# Patient Record
Sex: MALE | Race: WHITE | Employment: FULL TIME | URBAN - METROPOLITAN AREA
[De-identification: names, ages, dates, MRNs, and addresses within clinical notes are randomized per-mention and may not be internally consistent; named-entity substitution may affect disease eponyms.]

---

## 2021-07-15 ENCOUNTER — HOSPITAL ENCOUNTER (OUTPATIENT)
Age: 61
Setting detail: OBSERVATION
Discharge: HOME | End: 2021-07-16
Attending: EMERGENCY MEDICINE | Admitting: INTERNAL MEDICINE
Payer: COMMERCIAL

## 2021-07-15 ENCOUNTER — HOSPITAL ENCOUNTER (EMERGENCY)
Dept: MRI IMAGING | Age: 61
Discharge: HOME | End: 2021-07-15
Attending: STUDENT IN AN ORGANIZED HEALTH CARE EDUCATION/TRAINING PROGRAM
Payer: COMMERCIAL

## 2021-07-15 ENCOUNTER — APPOINTMENT (OUTPATIENT)
Dept: CT IMAGING | Age: 61
End: 2021-07-15
Attending: EMERGENCY MEDICINE
Payer: COMMERCIAL

## 2021-07-15 DIAGNOSIS — E78.2 MIXED HYPERLIPIDEMIA: ICD-10-CM

## 2021-07-15 DIAGNOSIS — I63.81 LEFT THALAMIC INFARCTION (HCC): ICD-10-CM

## 2021-07-15 DIAGNOSIS — I63.81 THALAMIC STROKE (HCC): Primary | ICD-10-CM

## 2021-07-15 DIAGNOSIS — I10 ESSENTIAL HYPERTENSION: ICD-10-CM

## 2021-07-15 PROBLEM — I63.9 ACUTE CVA (CEREBROVASCULAR ACCIDENT) (HCC): Status: ACTIVE | Noted: 2021-07-15

## 2021-07-15 LAB
ALBUMIN SERPL-MCNC: 3.8 G/DL (ref 3.5–5)
ALBUMIN/GLOB SERPL: 0.9 {RATIO} (ref 1.1–2.2)
ALP SERPL-CCNC: 55 U/L (ref 45–117)
ALT SERPL-CCNC: 26 U/L (ref 12–78)
ANION GAP SERPL CALC-SCNC: 4 MMOL/L (ref 5–15)
AST SERPL-CCNC: 16 U/L (ref 15–37)
BASOPHILS # BLD: 0.1 K/UL (ref 0–0.1)
BASOPHILS NFR BLD: 1 % (ref 0–1)
BILIRUB SERPL-MCNC: 0.3 MG/DL (ref 0.2–1)
BUN SERPL-MCNC: 11 MG/DL (ref 6–20)
BUN/CREAT SERPL: 14 (ref 12–20)
CALCIUM SERPL-MCNC: 8.8 MG/DL (ref 8.5–10.1)
CHLORIDE SERPL-SCNC: 106 MMOL/L (ref 97–108)
CO2 SERPL-SCNC: 28 MMOL/L (ref 21–32)
COMMENT, HOLDF: NORMAL
CREAT SERPL-MCNC: 0.77 MG/DL (ref 0.7–1.3)
DIFFERENTIAL METHOD BLD: ABNORMAL
EOSINOPHIL # BLD: 0.3 K/UL (ref 0–0.4)
EOSINOPHIL NFR BLD: 2 % (ref 0–7)
ERYTHROCYTE [DISTWIDTH] IN BLOOD BY AUTOMATED COUNT: 13.3 % (ref 11.5–14.5)
GLOBULIN SER CALC-MCNC: 4.4 G/DL (ref 2–4)
GLUCOSE SERPL-MCNC: 103 MG/DL (ref 65–100)
HCT VFR BLD AUTO: 42.2 % (ref 36.6–50.3)
HGB BLD-MCNC: 14.2 G/DL (ref 12.1–17)
IMM GRANULOCYTES # BLD AUTO: 0.1 K/UL (ref 0–0.04)
IMM GRANULOCYTES NFR BLD AUTO: 0 % (ref 0–0.5)
LYMPHOCYTES # BLD: 3.2 K/UL (ref 0.8–3.5)
LYMPHOCYTES NFR BLD: 27 % (ref 12–49)
MCH RBC QN AUTO: 28.3 PG (ref 26–34)
MCHC RBC AUTO-ENTMCNC: 33.6 G/DL (ref 30–36.5)
MCV RBC AUTO: 84.2 FL (ref 80–99)
MONOCYTES # BLD: 0.8 K/UL (ref 0–1)
MONOCYTES NFR BLD: 7 % (ref 5–13)
NEUTS SEG # BLD: 7.5 K/UL (ref 1.8–8)
NEUTS SEG NFR BLD: 63 % (ref 32–75)
NRBC # BLD: 0 K/UL (ref 0–0.01)
NRBC BLD-RTO: 0 PER 100 WBC
PLATELET # BLD AUTO: 343 K/UL (ref 150–400)
PMV BLD AUTO: 10.4 FL (ref 8.9–12.9)
POTASSIUM SERPL-SCNC: 3.8 MMOL/L (ref 3.5–5.1)
PROT SERPL-MCNC: 8.2 G/DL (ref 6.4–8.2)
RBC # BLD AUTO: 5.01 M/UL (ref 4.1–5.7)
SAMPLES BEING HELD,HOLD: NORMAL
SODIUM SERPL-SCNC: 138 MMOL/L (ref 136–145)
TROPONIN I SERPL-MCNC: <0.05 NG/ML
WBC # BLD AUTO: 11.9 K/UL (ref 4.1–11.1)

## 2021-07-15 PROCEDURE — 70551 MRI BRAIN STEM W/O DYE: CPT

## 2021-07-15 PROCEDURE — 85025 COMPLETE CBC W/AUTO DIFF WBC: CPT

## 2021-07-15 PROCEDURE — 80053 COMPREHEN METABOLIC PANEL: CPT

## 2021-07-15 PROCEDURE — 36415 COLL VENOUS BLD VENIPUNCTURE: CPT

## 2021-07-15 PROCEDURE — 70450 CT HEAD/BRAIN W/O DYE: CPT

## 2021-07-15 PROCEDURE — 84484 ASSAY OF TROPONIN QUANT: CPT

## 2021-07-15 PROCEDURE — 80061 LIPID PANEL: CPT

## 2021-07-15 PROCEDURE — 99283 EMERGENCY DEPT VISIT LOW MDM: CPT

## 2021-07-15 PROCEDURE — 83036 HEMOGLOBIN GLYCOSYLATED A1C: CPT

## 2021-07-15 RX ORDER — ASPIRIN 325 MG
325 TABLET ORAL
Status: COMPLETED | OUTPATIENT
Start: 2021-07-15 | End: 2021-07-16

## 2021-07-15 NOTE — ED TRIAGE NOTES
Patient reports he had a sudden onset of dizziness and numbness/tingling in his right leg and right shoulder yesterday at 330pm-    Pt reports his symptoms have continued today and his family requested he be evaluated. Pt feels as though his symptoms have slightly improved but are still present. Patient denies weakness in his legs/arm, grasps are equal and patient has no drifts or droops.      Pt does report he felt as though the right side of his nose was also numb today but he has no facial droop

## 2021-07-16 ENCOUNTER — APPOINTMENT (OUTPATIENT)
Dept: NON INVASIVE DIAGNOSTICS | Age: 61
End: 2021-07-16
Attending: INTERNAL MEDICINE
Payer: COMMERCIAL

## 2021-07-16 ENCOUNTER — APPOINTMENT (OUTPATIENT)
Dept: VASCULAR SURGERY | Age: 61
End: 2021-07-16
Attending: INTERNAL MEDICINE
Payer: COMMERCIAL

## 2021-07-16 VITALS
HEIGHT: 74 IN | OXYGEN SATURATION: 95 % | WEIGHT: 290.35 LBS | RESPIRATION RATE: 19 BRPM | HEART RATE: 76 BPM | TEMPERATURE: 98.2 F | SYSTOLIC BLOOD PRESSURE: 146 MMHG | DIASTOLIC BLOOD PRESSURE: 86 MMHG | BODY MASS INDEX: 37.26 KG/M2

## 2021-07-16 PROBLEM — R03.0 ELEVATED BLOOD PRESSURE READING: Status: ACTIVE | Noted: 2021-07-16

## 2021-07-16 PROBLEM — I10 HTN (HYPERTENSION): Status: ACTIVE | Noted: 2021-07-16

## 2021-07-16 LAB
CHOLEST SERPL-MCNC: 198 MG/DL
EST. AVERAGE GLUCOSE BLD GHB EST-MCNC: 120 MG/DL
HBA1C MFR BLD: 5.8 % (ref 4–5.6)
HDLC SERPL-MCNC: 45 MG/DL
HDLC SERPL: 4.4 {RATIO} (ref 0–5)
LDLC SERPL CALC-MCNC: 131.6 MG/DL (ref 0–100)
TRIGL SERPL-MCNC: 107 MG/DL (ref ?–150)
VLDLC SERPL CALC-MCNC: 21.4 MG/DL

## 2021-07-16 PROCEDURE — 97165 OT EVAL LOW COMPLEX 30 MIN: CPT

## 2021-07-16 PROCEDURE — 74011250637 HC RX REV CODE- 250/637: Performed by: INTERNAL MEDICINE

## 2021-07-16 PROCEDURE — 65270000029 HC RM PRIVATE

## 2021-07-16 PROCEDURE — 74011250636 HC RX REV CODE- 250/636: Performed by: INTERNAL MEDICINE

## 2021-07-16 PROCEDURE — 93880 EXTRACRANIAL BILAT STUDY: CPT

## 2021-07-16 PROCEDURE — 74011250637 HC RX REV CODE- 250/637: Performed by: STUDENT IN AN ORGANIZED HEALTH CARE EDUCATION/TRAINING PROGRAM

## 2021-07-16 PROCEDURE — 99204 OFFICE O/P NEW MOD 45 MIN: CPT | Performed by: PSYCHIATRY & NEUROLOGY

## 2021-07-16 PROCEDURE — 93005 ELECTROCARDIOGRAM TRACING: CPT

## 2021-07-16 PROCEDURE — 97116 GAIT TRAINING THERAPY: CPT

## 2021-07-16 PROCEDURE — 97161 PT EVAL LOW COMPLEX 20 MIN: CPT

## 2021-07-16 PROCEDURE — 96372 THER/PROPH/DIAG INJ SC/IM: CPT

## 2021-07-16 PROCEDURE — 97535 SELF CARE MNGMENT TRAINING: CPT

## 2021-07-16 PROCEDURE — 99218 HC RM OBSERVATION: CPT

## 2021-07-16 RX ORDER — ACETAMINOPHEN 325 MG/1
650 TABLET ORAL
Status: DISCONTINUED | OUTPATIENT
Start: 2021-07-16 | End: 2021-07-16 | Stop reason: HOSPADM

## 2021-07-16 RX ORDER — ATORVASTATIN CALCIUM 20 MG/1
80 TABLET, FILM COATED ORAL
Status: DISCONTINUED | OUTPATIENT
Start: 2021-07-16 | End: 2021-07-16 | Stop reason: HOSPADM

## 2021-07-16 RX ORDER — ENOXAPARIN SODIUM 100 MG/ML
40 INJECTION SUBCUTANEOUS EVERY 24 HOURS
Status: DISCONTINUED | OUTPATIENT
Start: 2021-07-16 | End: 2021-07-16 | Stop reason: HOSPADM

## 2021-07-16 RX ORDER — IBUPROFEN 200 MG
600 TABLET ORAL
COMMUNITY
End: 2021-08-10

## 2021-07-16 RX ORDER — GUAIFENESIN 100 MG/5ML
81 LIQUID (ML) ORAL DAILY
Status: DISCONTINUED | OUTPATIENT
Start: 2021-07-16 | End: 2021-07-16 | Stop reason: HOSPADM

## 2021-07-16 RX ORDER — ATORVASTATIN CALCIUM 80 MG/1
80 TABLET, FILM COATED ORAL
Qty: 30 TABLET | Refills: 1 | Status: SHIPPED | OUTPATIENT
Start: 2021-07-16 | End: 2021-08-15

## 2021-07-16 RX ORDER — ACETAMINOPHEN 650 MG/1
650 SUPPOSITORY RECTAL
Status: DISCONTINUED | OUTPATIENT
Start: 2021-07-16 | End: 2021-07-16 | Stop reason: HOSPADM

## 2021-07-16 RX ORDER — SODIUM CHLORIDE 0.9 % (FLUSH) 0.9 %
5-40 SYRINGE (ML) INJECTION AS NEEDED
Status: DISCONTINUED | OUTPATIENT
Start: 2021-07-16 | End: 2021-07-16 | Stop reason: HOSPADM

## 2021-07-16 RX ORDER — CLOPIDOGREL BISULFATE 75 MG/1
75 TABLET ORAL DAILY
Status: DISCONTINUED | OUTPATIENT
Start: 2021-07-16 | End: 2021-07-16 | Stop reason: HOSPADM

## 2021-07-16 RX ORDER — GUAIFENESIN 100 MG/5ML
81 LIQUID (ML) ORAL DAILY
Qty: 30 TABLET | Refills: 3 | Status: SHIPPED | OUTPATIENT
Start: 2021-07-17 | End: 2021-08-10

## 2021-07-16 RX ORDER — SODIUM CHLORIDE 0.9 % (FLUSH) 0.9 %
5-40 SYRINGE (ML) INJECTION EVERY 8 HOURS
Status: DISCONTINUED | OUTPATIENT
Start: 2021-07-16 | End: 2021-07-16 | Stop reason: HOSPADM

## 2021-07-16 RX ORDER — ASPIRIN 325 MG
325 TABLET ORAL DAILY
Status: DISCONTINUED | OUTPATIENT
Start: 2021-07-16 | End: 2021-07-16

## 2021-07-16 RX ORDER — POLYETHYLENE GLYCOL 3350 17 G/17G
17 POWDER, FOR SOLUTION ORAL DAILY PRN
Status: DISCONTINUED | OUTPATIENT
Start: 2021-07-16 | End: 2021-07-16 | Stop reason: HOSPADM

## 2021-07-16 RX ADMIN — CLOPIDOGREL BISULFATE 75 MG: 75 TABLET ORAL at 12:38

## 2021-07-16 RX ADMIN — Medication 10 ML: at 06:23

## 2021-07-16 RX ADMIN — ATORVASTATIN CALCIUM 80 MG: 20 TABLET, FILM COATED ORAL at 01:57

## 2021-07-16 RX ADMIN — Medication 10 ML: at 01:11

## 2021-07-16 RX ADMIN — ENOXAPARIN SODIUM 40 MG: 40 INJECTION SUBCUTANEOUS at 06:23

## 2021-07-16 RX ADMIN — ASPIRIN 81 MG: 81 TABLET, CHEWABLE ORAL at 12:38

## 2021-07-16 RX ADMIN — ASPIRIN 325 MG ORAL TABLET 325 MG: 325 PILL ORAL at 01:10

## 2021-07-16 NOTE — ED NOTES
Nursing rounds. Pt oob sitting on stool eating lunch. Daughter at bedside. Denies c/o. Will con't to monitor.

## 2021-07-16 NOTE — PROGRESS NOTES
OCCUPATIONAL THERAPY EVALUATION/DISCHARGE  Patient: Julia Lagos (82 y.o. male)  Date: 7/16/2021  Primary Diagnosis: Acute CVA (cerebrovascular accident) [facility]) [I63.9]       Precautions:    ([facility] stroke protocol)    ASSESSMENT  Based on the objective data described below, the patient presents with hospital admission secondary to acute CVA. Patient reports symptoms of numbness/tingling in R shoulder and RLE. Patient with confirmed small left thalamic infarct. Patient received supine in bed, agreeable to activity and reports only continued tingling in RLE. Patient able to demonstrate good strength, functional mobility, coordination and balance. [facility] and verbalized understanding. Functional Outcome Measure: The patient scored 66/66 on the Barthel Index  outcome measure . Other factors to consider for discharge: none     PLAN :  Recommend with staff:     Recommendation for discharge: (in order for the patient to meet his/her long term goals)  No skilled occupational therapy/ follow up rehabilitation needs identified at this time. This discharge recommendation:  Has been made in collaboration with the attending provider and/or case management    IF patient discharges home will need the following DME: none       SUBJECTIVE:   Patient stated I have been working for 2 days. I didn't know what was going on. I thought it was the heat.     OBJECTIVE DATA SUMMARY:   HISTORY:   No past medical history on file. No past surgical history on file.     Prior Level of Function/Environment/Context: independent  Expanded or extensive additional review of patient history:   Home Situation  Home Environment: Private residence  Living Alone: No  Support Systems: Spouse/Significant Other/Partner  Patient Expects to be Discharged to[de-identified] House  Current DME Used/Available at Home: None    Hand dominance: Right    EXAMINATION OF PERFORMANCE DEFICITS:  Cognitive/Behavioral Status:  Neurologic State: Alert  Orientation Level: Oriented X4  Cognition: Appropriate decision making; Follows commands  Perception: Appears intact  Perseveration: No perseveration noted  Safety/Judgement: Awareness of environment    Skin: intact as seen    Edema: none noted     Hearing:       Vision/Perceptual:    Tracking: Able to track stimulus in all quadrants w/o difficulty                                Range of Motion:  AROM: Within functional limits  PROM: Within functional limits                      Strength:  Strength: Within functional limits                Coordination:  Coordination: Within functional limits  Fine Motor Skills-Upper: Left Intact; Right Intact    Gross Motor Skills-Upper: Left Intact; Right Intact    Tone & Sensation:  Tone: Normal  Sensation: Impaired (RLE)                      Balance:  Sitting: Intact  Standing: Intact    Functional Mobility and Transfers for ADLs:  Bed Mobility:  Rolling: Independent  Supine to Sit: Independent  Sit to Supine: Independent  Scooting: Independent    Transfers:  Sit to Stand: Independent  Stand to Sit: Independent  Toilet Transfer : Independent    ADL Assessment:  Feeding: Independent    Oral Facial Hygiene/Grooming: Independent    Bathing: Independent    Upper Body Dressing: Independent    Lower Body Dressing: Independent    Toileting: Independent                ADL Intervention and task modifications:                                     Cognitive Retraining  Safety/Judgement: Awareness of environment    Therapeutic Exercise:   Functional Measure:  Fugl-Canela Assessment of Motor Recovery after Stroke:     Reflex Activity  Flexors/Biceps/Fingers: Can be elicited  Extensors/Triceps: Can be elicited  Reflex Subtotal: 4    Volitional Movement Within Synergies  Shoulder Retraction: Full  Shoulder Elevation: Full  Shoulder Abduction (90 degrees): Full  Shoulder External Rotation: Full  Elbow Flexion: Full  Forearm Supination: Full  Shoulder Adduction/Internal Rotation: Full  Elbow Extension: Full  Forearm Pronation: Full  Subtotal: 18    Volitional Movement Mixing Synergies  Hand to Lumbar Spine: Full  Shoulder Flexion (0-90 degrees): Full  Pronation-Supination: Full  Subtotal: 6    Volitional Movement With Little or No Synergy  Shoulder Abduction (0-90 degrees): Full  Shoulder Flexion ( degrees): Full  Pronation/Supination: Full  Subtotal : 6    Normal Reflex Activity  Biceps, Triceps, Finger Flexors: Full  Subtotal : 2    Upper Extremity Total   Upper Extremity Total: 36    Wrist  Stability at 15 Degree Dorsiflexion: Full  Repeated Dorsiflexion/ Volar Flexion: Full  Stability at 15 Degree Dorsiflexion: Full  Repeated Dorsiflexion/ Volar Flexion: Full  Circumduction: Full  Wrist Total: 10    Hand  Mass Flexion: Full  Mass Extension: Full  Grasp A: Full  Grasp B: Full  Grasp C: Full  Grasp D: Full  Grasp E: Full  Hand Total: 14    Coordination/Speed  Tremor: None  Dysmetria: None  Time: <1s  Coordination/Speed Total : 6    Total A-D  Total A-D (Motor Function): 66/66         This is a reliable/valid measure of arm function after a neurological event. It has established value to characterize functional status and for measuring spontaneous and therapy-induced recovery; tests proximal and distal motor functions. Fugl-Canela Assessment  UE scores recorded between five and 30 days post neurologic event can be used to predict UE recovery at six months post neurologic event. Severe = 0-21 points   Moderately Severe = 22-33 points   Moderate = 34-47 points   Mild = 48-66 points  MICHELLE Prieto, ANNETTE Alvarez, & ILIA Bond (1992). Measurement of motor recovery after stroke: Outcome assessment and sample size requirements.  Stroke, 23, pp. 5036-8124.   --------------------------------------------------------------------------------------------------------------------------------------------------------------------  MCID:  Stroke:   Aguila Alvares, 2001; n = 171; mean age 79 (11) years; assessed within 16 (12) days of stroke, Acute Stroke)  FMA Motor Scores from Admission to Discharge   10 point increase in FMA Upper Extremity = 1.5 change in discharge FIM   10 point increase in FMA Lower Extremity = 1.9 change in discharge FIM  MDC:   Stroke:   Antione Peters et al, 2008, n = 14, mean age = 59.9 (14.6) years, assessed on average 14 (6.5) months post stroke, Chronic Stroke)   FMA = 5.2 points for the Upper Extremity portion of the assessment         Occupational Therapy Evaluation Charge Determination   History Examination Decision-Making   LOW Complexity : Brief history review  LOW Complexity : 1-3 performance deficits relating to physical, cognitive , or psychosocial skils that result in activity limitations and / or participation restrictions  LOW Complexity : No comorbidities that affect functional and no verbal or physical assistance needed to complete eval tasks       Based on the above components, the patient evaluation is determined to be of the following complexity level: LOW   Pain Rating:      Activity Tolerance:   Good    After treatment patient left in no apparent distress:    Supine in bed and Call bell within reach    COMMUNICATION/EDUCATION:   The patients plan of care was discussed with: Physical therapist and Registered nurse.      Thank you for this referral.  Earlene Whitney OTR/L  Time Calculation: 23 mins

## 2021-07-16 NOTE — CONSULTS
STROKE/TRANSIENT ISCHEMIC ATTACK CONSULT NOTE    Patient ID:  Mukesh Ritchie  [id number]  28 y.o.  1960    Date of Consultation:  July 16, 2021    Referring Physician: Dr. Andre Cahves    Reason for Consultation:  Right sided numbness    History of Present Illness:     Patient Active Problem List    Diagnosis Date Noted    Elevated blood pressure reading 07/16/2021    Acute CVA (cerebrovascular accident) ([address] 07/15/2021     No past medical history on file. No past surgical history on file. Prior to Admission medications    Medication Sig Start Date End Date Taking? Authorizing Provider   ibuprofen (MOTRIN) 200 mg tablet Take 600 mg by mouth daily as needed for Pain. Yes Provider, Historical     No Known Allergies   Social History     Tobacco Use    Smoking status: Not on file   Substance Use Topics    Alcohol use: Not on file      No family history on file. Subjective:      Mukesh Ritchie is a 59 y.o. obese RHWM with no significant past medical history who was admitted from the ER for new onset right sided numbness. Patient admits to not seeing her regular doctor for the past 3 years. Condition started 1 day prior to admission with numbness of the right leg and right shoulder. There is improved and then was mainly localized to the right anterolateral chest area and flank. Persistence led patient to go to the ER. Also noted some dizziness. In the ER blood pressure was 172/99. Laboratory work-up revealed increased WBC at 11.9, slightly increased hemoglobin A1c at 5.8, increased LDL at 131.6. CMP and troponin were unremarkable. Head CT without contrast did not reveal any acute process. Brain MRI revealed small left acute thalamic infarct. Carotid Doppler study revealed 0 to 49% bilateral ICA stenosis. Echocardiogram is pending. When seen, patient reports resolution of his symptoms. Denies any weakness. No speech changes.     Outside reports reviewed: ER records, radiology reports, lab reports. Review of Systems:    Pertinent items are noted in HPI. Objective:     Patient Vitals for the past 8 hrs:   BP Temp Pulse Resp SpO2   07/16/21 0400 (!) 160/82 97.8 °F (36.6 °C) 68 12 95 %     PHYSICAL EXAM:    NEUROLOGICAL EXAM:    Appearance: The patient is well developed, well nourished, provides a coherent history and is in no acute distress. Mental Status: Oriented to time, place and person. Fluent, no aphasia or dysarthria. Mood and affect appropriate. Cranial Nerves:   Intact visual fields. RHETT, EOM's full, no nystagmus, no ptosis. Facial sensation is normal. Corneal reflexes are intact. Facial movement is symmetric. Hearing is normal bilaterally. Palate is midline with normal elevation. Sternocleidomastoid and trapezius muscles are normal. Tongue is midline. Motor:  5/5 strength in upper and lower proximal and distal muscles. Normal bulk and tone. No pronator drift. Reflexes:   Deep tendon reflexes 1+/4 and symmetrical. Downgoing toes. Sensory:   Normal to cold, pinprick and vibration except decrease PP right sheen and foot. Gait:  Normal gait. No Romberg. Tremor:   No tremor noted. Cerebellar:  Intact FTN/ALEXEY/HTS. Imaging  CT Head, brain MRI: reviewed    Lab Review    Recent Results (from the past 24 hour(s))   SAMPLES BEING HELD    Collection Time: 07/15/21  7:58 PM   Result Value Ref Range    SAMPLES BEING HELD  1RED TOP, 1 DRK GREEN     COMMENT        Add-on orders for these samples will be processed based on acceptable specimen integrity and analyte stability, which may vary by analyte.    CBC WITH AUTOMATED DIFF    Collection Time: 07/15/21  7:58 PM   Result Value Ref Range    WBC 11.9 (H) 4.1 - 11.1 K/uL    RBC 5.01 4.10 - 5.70 M/uL    HGB 14.2 12.1 - 17.0 g/dL    HCT 42.2 36.6 - 50.3 %    MCV 84.2 80.0 - 99.0 FL    MCH 28.3 26.0 - 34.0 PG    MCHC 33.6 30.0 - 36.5 g/dL    RDW 13.3 11.5 - 14.5 %    PLATELET 189 331 - 272 K/uL    MPV 10.4 8.9 - 12.9 FL    NRBC 0.0 0  WBC    ABSOLUTE NRBC 0.00 0.00 - 0.01 K/uL    NEUTROPHILS 63 32 - 75 %    LYMPHOCYTES 27 12 - 49 %    MONOCYTES 7 5 - 13 %    EOSINOPHILS 2 0 - 7 %    BASOPHILS 1 0 - 1 %    IMMATURE GRANULOCYTES 0 0.0 - 0.5 %    ABS. NEUTROPHILS 7.5 1.8 - 8.0 K/UL    ABS. LYMPHOCYTES 3.2 0.8 - 3.5 K/UL    ABS. MONOCYTES 0.8 0.0 - 1.0 K/UL    ABS. EOSINOPHILS 0.3 0.0 - 0.4 K/UL    ABS. BASOPHILS 0.1 0.0 - 0.1 K/UL    ABS. IMM. GRANS. 0.1 (H) 0.00 - 0.04 K/UL    DF AUTOMATED     METABOLIC PANEL, COMPREHENSIVE    Collection Time: 07/15/21  7:58 PM   Result Value Ref Range    Sodium 138 136 - 145 mmol/L    Potassium 3.8 3.5 - 5.1 mmol/L    Chloride 106 97 - 108 mmol/L    CO2 28 21 - 32 mmol/L    Anion gap 4 (L) 5 - 15 mmol/L    Glucose 103 (H) 65 - 100 mg/dL    BUN 11 6 - 20 MG/DL    Creatinine 0.77 0.70 - 1.30 MG/DL    BUN/Creatinine ratio 14 12 - 20      GFR est AA >60 >60 ml/min/1.73m2    GFR est non-AA >60 >60 ml/min/1.73m2    Calcium 8.8 8.5 - 10.1 MG/DL    Bilirubin, total 0.3 0.2 - 1.0 MG/DL    ALT (SGPT) 26 12 - 78 U/L    AST (SGOT) 16 15 - 37 U/L    Alk.  phosphatase 55 45 - 117 U/L    Protein, total 8.2 6.4 - 8.2 g/dL    Albumin 3.8 3.5 - 5.0 g/dL    Globulin 4.4 (H) 2.0 - 4.0 g/dL    A-G Ratio 0.9 (L) 1.1 - 2.2     TROPONIN I    Collection Time: 07/15/21  7:58 PM   Result Value Ref Range    Troponin-I, Qt. <0.05 <0.05 ng/mL   LIPID PANEL    Collection Time: 07/15/21  7:58 PM   Result Value Ref Range    Cholesterol, total 198 <200 MG/DL    Triglyceride 107 <150 MG/DL    HDL Cholesterol 45 MG/DL    LDL, calculated 131.6 (H) 0 - 100 MG/DL    VLDL, calculated 21.4 MG/DL    CHOL/HDL Ratio 4.4 0.0 - 5.0     HEMOGLOBIN A1C WITH EAG    Collection Time: 07/15/21  7:58 PM   Result Value Ref Range    Hemoglobin A1c 5.8 (H) 4.0 - 5.6 %    Est. average glucose 120 mg/dL         Assessment:   Acute thalamic infarct  Hyperlipidemia  Hypertension    Plan:   Neurological examination reveals mainly decreased sensation right anterior shin and foot. Status post left thalamic infarct. Likely secondary to hypertension. Head CT without contrast did not reveal any acute process. Brain MRI without contrast revealed small left thalamic infarct. Carotid Doppler study did not reveal any significant stenosis. LDL was elevated at 131.6. Continue atorvastatin 80 mg daily. Advised dietary modification. Echocardiogram is pending. Discontinue Plavix. Continue aspirin 81 mg daily for stroke prophylaxis. Patient was advised to monitor blood pressures at home twice a day and if any readings greater than 130/80 then he needs to be on blood pressure medication. Advised to establish care with a primary physician. Patient is okay to be discharged from a neurological standpoint. Patient was informed per DMV regulation he cannot drive for 6 months. He understood. Follow-up with outpatient neurology in 4 weeks. Thank you for the consult. This note was created using voice recognition software. Despite editing, there may be syntax errors.

## 2021-07-16 NOTE — DISCHARGE INSTRUCTIONS
DMV Transient Ischemic Attack and/or Cerebral Vascular Accident Policy   It is the policy of the Department of Motor Vehicles, based on guidance and recommendations from the [address], that if a  suffers a Transient Ischemic Attack (TIA), the s privilege to operate a motor vehicle will be suspended for three months. The three-month suspension may be shortened for drivers who have suffered a TIA, provided that treatment has been provided mitigating the risk of reoccurrence and there is no impact on a s ability to operate a motor vehicle safely. These cases may be referred to the [address] for their guidance and recommendations. If a  suffers a Cerebral Vascular Accident (CVA), the s privilege to operate a motor vehicle will be suspended for six months. This six-month suspension period may be shortened if V receives information from the 's health care provider indicating that the  has fully recovered. These cases may be referred to the [address] for its guidance and recommendations. Following the six-month suspension, [facility] will request an evaluation with a certified  rehabilitation specialist if the  has suffered paralysis or cognitive changes due to the CVA. If the s physical and cognitive information is not indicated in the medical report received by the agency, [facility] will request it from the health care provider.    ([email or web address]). Based on the information received about the s cognitive abilities, the  may also be subject to the [facility]. The  is also required to furnish an updated Vision Report with an examination date that is not older than 90 days and occurs after the TIA/CVA, in accordance with [place] Code [place].2-311.      [facility] may impose additional requirements on the individual depending on the information received by the agency. [email or web address]

## 2021-07-16 NOTE — PROGRESS NOTES
Admission Medication Reconciliation:     Information obtained from:    Patient via interview in [address]  RxQuery data available¹:  YES    Comments/Recommendations:   Patient able to confirm name, , allergies, and preferred pharmacy  Updated PTA medication list       ¹RxQuery pharmacy benefit data reflects medications filled and processed through the patient's insurance, however   this data does NOT capture whether the medication was picked up or is currently being taken by the patient. Prior to Admission Medications   Prescriptions Last Dose Informant Taking?   ibuprofen (MOTRIN) 200 mg tablet 7/15/2021 at pm  Yes   Sig: Take 600 mg by mouth daily as needed for Pain. Facility-Administered Medications: None         Please contact the main inpatient pharmacy with any questions or concerns at [phone] and we will direct you to the clinical pharmacist covering this patient's care while in-house.    Galo Akhtar, DillonD, BCPS

## 2021-07-16 NOTE — PROGRESS NOTES
Patient alert and chewing gum. No speech or swallowing issues noted by patient. SLP evaluation deferred at this time.

## 2021-07-16 NOTE — PROGRESS NOTES
Problem: Mobility Impaired (Adult and Pediatric)  Goal: *Acute Goals and Plan of Care (Insert Text)  Outcome: Progressing Towards Goal   PHYSICAL THERAPY EVALUATION/DISCHARGE  Patient: Mukesh Ritchie (53 y.o. male)  Date: 7/16/2021  Primary Diagnosis: Acute CVA (cerebrovascular accident) ([facility] [I63.9]        Precautions:    ([facility] stroke protocol)      ASSESSMENT  Based on the objective data described below, the patient presents with admission due to small L thalamic infarct with symptoms of tingling/numbness R shoulder/UE/LE and dizziness. Received in ER bed with good resolution, full motor control, good balance, and gait. Pt scoring 56/56 on Hobbs Balance Test making him a LOW to no fall risk. Gait of 100' without need of assistive device. Toileted self with independence. Pt educated with  [facility] stroke protcol. Further skilled acute physical therapy is not indicated at this time. Functional Outcome Measure: The patient scored 56/56 on the Hobbs outcome measure which is indicative of LOW fall risk. Other factors to consider for discharge: none     Further skilled acute physical therapy is not indicated at this time. PLAN :  Recommendation for discharge: (in order for the patient to meet his/her long term goals)  No skilled physical therapy/ follow up rehabilitation needs identified at this time. This discharge recommendation:  Has not yet been discussed the attending provider and/or case management    IF patient discharges home will need the following DME: none       SUBJECTIVE:   Patient stated I am doing better now.     OBJECTIVE DATA SUMMARY:   HISTORY:    No past medical history on file. No past surgical history on file.     Prior level of function: independent  Personal factors and/or comorbidities impacting plan of care: per above and below    Home Situation  Home Environment: Private residence  Living Alone: No  Support Systems: Spouse/Significant Other/Partner  Patient Expects to be Discharged to[de-identified] House  Current DME Used/Available at Home: None    EXAMINATION/PRESENTATION/DECISION MAKING:   Critical Behavior:  Neurologic State: Alert  Orientation Level: Oriented X4  Cognition: Appropriate decision making, Follows commands  Safety/Judgement: Awareness of environment  Hearing:     Skin:  IV  Edema:  ANL  Range Of Motion:  AROM: Within functional limits           PROM: Within functional limits           Strength:    Strength:  Within functional limits                    Tone & Sensation:   Tone: Normal              Sensation: Impaired (RLE)               Coordination:  Coordination: Within functional limits  Vision:   Tracking: Able to track stimulus in all quadrants w/o difficulty  Functional Mobility:  Bed Mobility:  Rolling: Independent  Supine to Sit: Independent  Sit to Supine: Independent  Scooting: Independent  Transfers:  Sit to Stand: Independent  Stand to Sit: Independent                       Balance:   Sitting: Intact  Standing: Intact  Ambulation/Gait Training:  Distance (ft): 100 Feet (ft)  Assistive Device: Gait belt  Ambulation - Level of Assistance: Independent                                               Functional Measure:  Hobbs Balance Test:    Sitting to Standin  Standing Unsupported: 4  Sitting with Back Unsupported: 4  Standing to Sittin  Transfers: 4  Standing Unsupported with Eyes Closed: 4  Standing Unsupported with Feet Together: 4  Reach Forward with Outstretched Arm: 4   Object: 4  Turn to Look Over Shoulders: 4  Turn 360 Degrees: 4  Alternate Foot on Step/Stool: 4  Standing Unsupported One Foot in Front: 4  Stand on One Le  Total: 56/56         56=Maximum possible score;   0-20=High fall risk  21-40=Moderate fall risk   41-56=Low fall risk               Physical Therapy Evaluation Charge Determination   History Examination Presentation Decision-Making   MEDIUM  Complexity : 1-2 comorbidities / personal factors will impact the outcome/ POC  LOW Complexity : 1-2 Standardized tests and measures addressing body structure, function, activity limitation and / or participation in recreation  LOW Complexity : Stable, uncomplicated  Other outcome measures Hobbs  LOW       Based on the above components, the patient evaluation is determined to be of the following complexity level: LOW     Activity Tolerance:   Good      After treatment patient left in no apparent distress:   Supine in bed and Call bell within reach    COMMUNICATION/EDUCATION:   The patients plan of care was discussed with: Occupational therapist and Registered nurse. Fall prevention education was provided and the patient/caregiver indicated understanding., Patient/family have participated as able in goal setting and plan of care. , and Patient/family agree to work toward stated goals and plan of care.     Thank you for this referral.  Jeanna Cordial, PT   Time Calculation: 24 mins

## 2021-07-16 NOTE — ED NOTES
Report rec'd from Singh, [facility], assumed care. Pt presents lying on stretcher, R sided, resting quietly. Eyes closed, resp e/u, nad, no voiced c/o. Will con't to monitor.

## 2021-07-16 NOTE — ED PROVIDER NOTES
The history is provided by the patient. Numbness  This is a new problem. The current episode started yesterday. The problem has been gradually improving. There was right upper extremity and right lower extremity focality noted. Primary symptoms include loss of sensation. Pertinent negatives include no focal weakness, no loss of balance, no slurred speech, no speech difficulty, no visual change, no mental status change, no unresponsiveness and no disorientation. There has been no fever. Pertinent negatives include no shortness of breath, no chest pain, no vomiting, no altered mental status, no confusion, no nausea and no bowel incontinence. There were no medications administered prior to arrival. Associated medical issues do not include trauma or dementia. No past medical history on file. No past surgical history on file. No family history on file. Social History     Socioeconomic History    Marital status:      Spouse name: Not on file    Number of children: Not on file    Years of education: Not on file    Highest education level: Not on file   Occupational History    Not on file   Tobacco Use    Smoking status: Not on file   Substance and Sexual Activity    Alcohol use: Not on file    Drug use: Not on file    Sexual activity: Not on file   Other Topics Concern    Not on file   Social History Narrative    Not on file     Social Determinants of Health     Financial Resource Strain:     Difficulty of Paying Living Expenses:    Food Insecurity:     Worried About Running Out of Food in the Last Year:     [address] in the Last Year:    Transportation Needs:     Lack of Transportation (Medical):      Lack of Transportation (Non-Medical):    Physical Activity:     Days of Exercise per Week:     Minutes of Exercise per Session:    Stress:     Feeling of Stress :    Social Connections:     Frequency of Communication with Friends and Family:     Frequency of Social Gatherings with Friends and Family:     Attends Mu-ism Services:     Active Member of Clubs or Organizations:     Attends Club or Organization Meetings:     Marital Status:    Intimate Partner Violence:     Fear of Current or Ex-Partner:     Emotionally Abused:     Physically Abused:     Sexually Abused: ALLERGIES: Patient has no known allergies. Review of Systems   Constitutional: Negative for chills and fever. HENT: Negative for sore throat. Respiratory: Negative for cough and shortness of breath. Cardiovascular: Negative for chest pain. Gastrointestinal: Negative for bowel incontinence, nausea and vomiting. Genitourinary: Negative for decreased urine volume and difficulty urinating. Musculoskeletal: Negative for back pain and neck pain. Neurological: Positive for numbness. Negative for focal weakness, speech difficulty and loss of balance. Psychiatric/Behavioral: Negative for confusion. Vitals:    07/15/21 1943   BP: (!) 172/99   Pulse: 71   Resp: 16   Temp: 97.3 °F (36.3 °C)   SpO2: 96%   Weight: 131.7 kg (290 lb 5.5 oz)   Height: 6' 2\" (1.88 m)            Physical Exam  Constitutional:       General: He is not in acute distress. Appearance: Normal appearance. He is obese. HENT:      Head: Normocephalic and atraumatic. Eyes:      Extraocular Movements: Extraocular movements intact. Conjunctiva/sclera: Conjunctivae normal.      Pupils: Pupils are equal, round, and reactive to light. Cardiovascular:      Rate and Rhythm: Normal rate and regular rhythm. Pulses: Normal pulses. Heart sounds: Normal heart sounds. Pulmonary:      Effort: Pulmonary effort is normal.      Breath sounds: Normal breath sounds. Abdominal:      General: Abdomen is flat. Bowel sounds are normal.      Palpations: Abdomen is soft. Tenderness: There is no abdominal tenderness. There is no guarding. Musculoskeletal:         General: Normal range of motion. Cervical back: Normal range of motion and neck supple. Right lower leg: No edema. Left lower leg: No edema. Skin:     General: Skin is warm and dry. Neurological:      General: No focal deficit present. Mental Status: He is alert and oriented to person, place, and time. Cranial Nerves: No cranial nerve deficit. Sensory: No sensory deficit. Motor: No weakness. Coordination: Coordination normal.      Gait: Gait normal.      Deep Tendon Reflexes: Reflexes normal.          [facility]  ED Course as of Jul 15 2344   Thu Jul 15, 2021   6136 TROPONIN I [DL]   2330 11:30 PM  Change of shift. Care of patient taken over from Dr. Linda Martinez; H&P reviewed, bedside handoff complete. Awaiting MRI. If neg dc with with follow-up with neuro      [ZD]      ED Course User Index  [DL] Marvin Soriano MD  [ZD] Amalia Sam MD     Patient presents with about 24 hours of numbness and tingling in his R foot and R shoulder. No recent illnesses, significant medical history, or medications. BP was 172/99 in triage, repeat was 167/104. Neurological exam was normal, back exam was not contributory. CBC demonstrated no anemia. Considering CVA vs polyneuropathy vs herniated disc. With elevated BP, noncontrast head CT was deemed appropriate and showed no acute abnormality. MRI showed \"Tiny, acute, left thalamic infarction\". He will be admitted to the hospitalist service.     Procedures

## 2021-07-16 NOTE — H&P
[facility]  [address], [facility] [address]  [phone]    Hospitalist Admission Note      NAME:  Gigi Roberts   :   1960   MRN:  [id number]     PCP:  Brent Moralez MD     Date of Service/Time:  2021 2:48 AM         Assessment / Plan:       59 y.o. male with no significant PMH presenting w/ R-sided numbness, found to have tiny L-thalamic CVA      Acute CVA (cerebrovascular accident): MRI brain showed tiny acute left thalamic infarction. Check EKG and monitor on tele. Complete CVA workup w/ TTE w/ bubble, carotid dopplers. Early initiation and short-term (21 days) DAPT using ASA + Plavix rather than aspirin monotherapy for patients with acute high-risk TIA or minor ischemic stroke who do not have a known cardiac source that requires anticoagulation. High intensity statin with atorvastatin, check lipid panel. Send A1c to further risk stratify. Frequent neuro checks. Permissive HTN for 24 hours post CVA, and aim for better BP control 24 to 48 hours after stroke onset. Treat HTN only if extreme (SBP>220 or DBP>120 or if pt has another clear indication, e.g. AS, heart failure, aortic dissection, hypertensive encephalopathy). If treating, would slowly decrease BP by ~15% during the first 24h after stroke onset. Can treat with IV labetalol or nicardipine. PT / OT / Speech evaluation if needed. Neurology consult. Patient will need to be advised of DMV driving restriction. Elevated blood pressure reading: permissive HTN as above      Obesity: Body mass index is 37.28 kg/m². Would benefit from weight loss and dietary / lifestyle modifications      Code Status: FULL     Surrogate decision maker: wife      ED notes, lab results, and imaging studies reviewed.        Total time spent with patient: 50 Minutes   Time spent in the care of this patient included reviewing records, discussing with nursing, obtaining history and examining the patient, and discussing treatment plans, with >50% time spent counseling/coordinating care    Risk of deterioration: High                 Care Plan discussed with: ED provider, Patient, Nursing Staff and >50% of time spent in counseling and coordination of care    Discussed:  Care Plan and D/C Planning    Prophylaxis:  Lovenox    Disposition:  Home w/Family                 Subjective:     CHIEF COMPLAINT: numbness     HISTORY OF PRESENT ILLNESS:     Mr. Cleopatra Romberg is a 59 y.o. male w/ no PMH presenting w/ numbness. Noticed sudden onset of dizziness and numbness/tingling in his right leg and right shoulder on Wednesday ~ 3:30 pm. Symptoms had continued so came to ED for evaluation at his wife's request. Symptoms have improved today and now only feel numbness in his right thorax region. Denied weakness, dizziness, confusion, headache, slurred speech, facial droop, CP, SOB. ED workup included brain MRI showing tiny L-thalamic infarct. Mr. Cleopatra Romberg is admitted for further evaluation and management.       PMH: unknown, has not seen a doctor in 3 years      Social History     Tobacco Use    Smoking status: No   Substance Use Topics    Alcohol use: Occasional         Family History: +CAD (father w/ CAD s/p CABG)    No Known Allergies     Prior to Admission medications    Not on File       Review of Systems:  (bold if positive, if negative)    Gen:  Eyes:  ENT:  CVS:  Pulm:  GI:  :  MS:  Skin:  Psych:  Endo:  Hem:  Renal:  Neuro:  Numbness, tingling,          Objective:      VITALS:    Vital signs reviewed; most recent are:    Visit Vitals  BP (!) 172/99 (BP 1 Location: Left upper arm, BP Patient Position: At rest;Sitting)   Pulse 71   Temp 97.3 °F (36.3 °C)   Resp 16   Ht 6' 2\" (1.88 m)   Wt 131.7 kg (290 lb 5.5 oz)   SpO2 96%   BMI 37.28 kg/m²     SpO2 Readings from Last 6 Encounters:   07/15/21 96%        No intake or output data in the 24 hours ending 07/16/21 0248         Exam:     Physical Exam:    Gen:  Well-developed, well-nourished, in no acute distress. Pleasant   HEENT:  No scleral icterus, PERRL, hearing intact to voice, moist mucous membranes  Neck:  Supple, without masses. Resp:  No accessory muscle use. CTAB without wheezing, rales, rhonchi  Card: RRR. Normal S1 and S2 without murmurs, rubs, or gallops. No peripheral lower extremity edema. No JVD. Peripheral pulses in tact. No carotid bruits  Abd:  Normoactive bowel sounds. Soft, non-tender, non-distended. No rebound, no guarding. No appreciable hepatosplenomegaly   Lymph:  No cervical adenopathy  Musc:  No cyanosis or clubbing  Skin:  No rashes or ulcers; turgor intact. Cap refill ~2 secs  Neuro:  Cranial nerves 3-12 intact, no focal motor weakness w/ 5/5 strength BUE and BLEs, follows commands appropriately  Psych:  Good insight, normal affect. Alert, oriented x 3. Answers questions appropriately       Labs:    Recent Labs     07/15/21  1960   WBC 11.9*   HGB 14.2   HCT 42.2        Recent Labs     07/15/21  1960      K 3.8      CO2 28   *   BUN 11   CREA 0.77   CA 8.8   ALB 3.8   ALT 26     No components found for: GLPOC  No results for input(s): PH, PCO2, PO2, HCO3, FIO2 in the last 72 hours. No results for input(s): INR, INREXT in the last 72 hours. No results found for: SDES  No results found for: CULT  All other current labs reviewed in the computer. Imaging/Studies:    MRI BRAIN WO CONT    Result Date: 7/15/2021  Tiny, acute, left thalamic infarction. The findings were called to Dr. Liss Jason on 7/15/2021 11:38 PM by Dr. Wil Horan [id number] CT HEAD WO CONT    Result Date: 7/15/2021  No acute abnormality    Imaging personally reviewed.     ___________________________________________________    Attending Physician: Courtney Ramirez MD

## 2021-07-16 NOTE — DISCHARGE SUMMARY
Physician Discharge Summary     Patient ID:  Julio Stewart  [id number]  79 y.o.  1960    Admit date: 7/15/2021    Discharge date and time: 7/16/2021    Admission Diagnoses: Acute CVA (cerebrovascular accident) [facility]) [I63.9]    Discharge Diagnoses: Active Problems:    Acute CVA (cerebrovascular accident) ([address] (7/15/2021)      Elevated blood pressure reading (7/16/2021)           Hospital Course:   Mr. Kelvin Rahman is a 59 y.o. male w/ no PMH presenting w/ numbness. Noticed sudden onset of dizziness and numbness/tingling in his right leg and right shoulder on Wednesday ~ 3:30 pm. Symptoms had continued so came to ED for evaluation at his wife's request. Symptoms have improved today and now only feel numbness in his right thorax region. Denied weakness, dizziness, confusion, headache, slurred speech, facial droop, CP, SOB.       ED workup included brain MRI showing tiny L-thalamic infarct. He was admitted for further testing including carotid dopplers, lipid profile. He was seen and evaluated by Neurology and ish discharge on ASA 81mg and Atorvastatin 80mg. BP was slightly elevated and he was counseled on monitoring with close PCP follow up.        PCP: Karen Tamez MD     Consults: Neurology    Condition of patient at discharge: good and improved    Discharge Exam:    Physical Exam:    Gen: Well-developed, well-nourished, in no acute distress  HEENT:  Pink conjunctivae, PERRL, hearing intact to voice, moist mucous membranes  Neck: Supple, without masses, thyroid non-tender  Resp: No accessory muscle use, clear breath sounds without wheezes rales or rhonchi  Card: No murmurs, normal S1, S2 without thrills, bruits or peripheral edema  Abd:  Soft, non-tender, non-distended, normoactive bowel sounds are present, no palpable organomegaly and no detectable hernias  Lymph:  No cervical or inguinal adenopathy  Musc: No cyanosis or clubbing  Skin: No rashes or ulcers, skin turgor is good  Neuro:  Cranial nerves are grossly intact, no focal motor weakness, follows commands appropriately  Psych:  Good insight, oriented to person, place and time, alert          Disposition: home    Patient Instructions:   Current Discharge Medication List      START taking these medications    Details   aspirin 81 mg chewable tablet Take 1 Tablet by mouth daily for 30 days. Qty: 30 Tablet, Refills: 3      atorvastatin (LIPITOR) 80 mg tablet Take 1 Tablet by mouth nightly for 30 days. Qty: 30 Tablet, Refills: 1         CONTINUE these medications which have NOT CHANGED    Details   ibuprofen (MOTRIN) 200 mg tablet Take 600 mg by mouth daily as needed for Pain. Activity: Activity as tolerated  Diet: Regular Diet  Wound Care: None needed    Follow-up with Masha Moreland MD in 1 week. Follow-up tests/labs    - BP    Approximate time spent in patient care on day of discharge: 20 min    Signed:   Osmany Scott MD  7/16/2021  1:38 PM

## 2021-07-16 NOTE — PROGRESS NOTES
7/16/2021  11:32 AM  Case management note    Reason for Admission:  Acute CVA    Patient came to hospital for dizziness and numbness. Patient is independent with ADL's, works full time,  and drives. [employer] @ [employer]                     RUR Score:      8%               Plan for utilizing home health:          PCP: First and Last name:  Marce Ayala MD     Name of Practice:    Are you a current patient: Yes/No: yes   Approximate date of last visit: 3 years   Can you participate in a virtual visit with your PCP:                     Current Advanced Directive/Advance Care Plan: Full Code  NO AD  Healthcare Decision Maker:   Aniket Garza                              Transition of Care Plan:              1. Home with family assistance  2. PCP follow up  3. AD planning  4. CM to follow for discharge needs    Care Management Interventions  PCP Verified by CM:  Yes (Dr. Eddie Brooks)  Mode of Transport at Discharge: Self  Current Support Network: Lives with Spouse  Confirm Follow Up Transport: Family  The Plan for Transition of Care is Related to the Following Treatment Goals : acute CVA  Discharge Location  Discharge Placement: Home with family assistance  Rebeca Rees

## 2021-07-17 LAB
LEFT CCA DIST DIAS: 31.6 CM/S
LEFT CCA DIST SYS: 152.4 CM/S
LEFT CCA PROX DIAS: 13.9 CM/S
LEFT CCA PROX SYS: 149.6 CM/S
LEFT ECA DIAS: 8.54 CM/S
LEFT ECA SYS: 90.1 CM/S
LEFT ICA DIST DIAS: 24.1 CM/S
LEFT ICA DIST SYS: 74.5 CM/S
LEFT ICA MID DIAS: 20.9 CM/S
LEFT ICA MID SYS: 74.1 CM/S
LEFT ICA PROX DIAS: 28.8 CM/S
LEFT ICA PROX SYS: 147.1 CM/S
LEFT ICA/CCA SYS: 0.97
LEFT SUBCLAVIAN DIAS: 0 CM/S
LEFT SUBCLAVIAN SYS: 187.3 CM/S
LEFT VERTEBRAL DIAS: 16.31 CM/S
LEFT VERTEBRAL SYS: 56.4 CM/S
RIGHT CCA DIST DIAS: 23.9 CM/S
RIGHT CCA DIST SYS: 132.5 CM/S
RIGHT CCA PROX DIAS: 26.7 CM/S
RIGHT CCA PROX SYS: 174.4 CM/S
RIGHT ECA DIAS: 14.38 CM/S
RIGHT ECA SYS: 130.7 CM/S
RIGHT ICA DIST DIAS: 25.2 CM/S
RIGHT ICA DIST SYS: 73.6 CM/S
RIGHT ICA MID DIAS: 11.8 CM/S
RIGHT ICA MID SYS: 74.7 CM/S
RIGHT ICA PROX DIAS: 22.5 CM/S
RIGHT ICA PROX SYS: 119.3 CM/S
RIGHT ICA/CCA SYS: 0.9
RIGHT SUBCLAVIAN DIAS: 0 CM/S
RIGHT SUBCLAVIAN SYS: 279.3 CM/S
RIGHT VERTEBRAL DIAS: 8.97 CM/S
RIGHT VERTEBRAL SYS: 38.2 CM/S

## 2021-07-18 LAB
ATRIAL RATE: 66 BPM
CALCULATED P AXIS, ECG09: 41 DEGREES
CALCULATED R AXIS, ECG10: -8 DEGREES
CALCULATED T AXIS, ECG11: 148 DEGREES
DIAGNOSIS, 93000: NORMAL
P-R INTERVAL, ECG05: 184 MS
Q-T INTERVAL, ECG07: 404 MS
QRS DURATION, ECG06: 94 MS
QTC CALCULATION (BEZET), ECG08: 423 MS
VENTRICULAR RATE, ECG03: 66 BPM

## 2021-08-09 ENCOUNTER — APPOINTMENT (OUTPATIENT)
Dept: CT IMAGING | Age: 61
End: 2021-08-09
Attending: STUDENT IN AN ORGANIZED HEALTH CARE EDUCATION/TRAINING PROGRAM
Payer: COMMERCIAL

## 2021-08-09 ENCOUNTER — HOSPITAL ENCOUNTER (OUTPATIENT)
Age: 61
Setting detail: OBSERVATION
Discharge: HOME | End: 2021-08-11
Attending: STUDENT IN AN ORGANIZED HEALTH CARE EDUCATION/TRAINING PROGRAM | Admitting: HOSPITALIST
Payer: COMMERCIAL

## 2021-08-09 ENCOUNTER — APPOINTMENT (OUTPATIENT)
Dept: GENERAL RADIOLOGY | Age: 61
End: 2021-08-09
Attending: STUDENT IN AN ORGANIZED HEALTH CARE EDUCATION/TRAINING PROGRAM
Payer: COMMERCIAL

## 2021-08-09 DIAGNOSIS — Z86.73 HISTORY OF CVA (CEREBROVASCULAR ACCIDENT): ICD-10-CM

## 2021-08-09 DIAGNOSIS — R42 VERTIGO: ICD-10-CM

## 2021-08-09 DIAGNOSIS — I16.0 HYPERTENSIVE URGENCY: ICD-10-CM

## 2021-08-09 DIAGNOSIS — R42 ACUTE SEVERE VERTIGO: Primary | ICD-10-CM

## 2021-08-09 DIAGNOSIS — G46.4 CEREBELLAR STROKE SYNDROME: ICD-10-CM

## 2021-08-09 LAB
ALBUMIN SERPL-MCNC: 3.6 G/DL (ref 3.5–5)
ALBUMIN/GLOB SERPL: 0.7 {RATIO} (ref 1.1–2.2)
ALP SERPL-CCNC: 69 U/L (ref 45–117)
ALT SERPL-CCNC: 33 U/L (ref 12–78)
ANION GAP SERPL CALC-SCNC: 2 MMOL/L (ref 5–15)
AST SERPL-CCNC: 64 U/L (ref 15–37)
BASOPHILS # BLD: 0.1 K/UL (ref 0–0.1)
BASOPHILS NFR BLD: 0 % (ref 0–1)
BILIRUB SERPL-MCNC: 1 MG/DL (ref 0.2–1)
BUN SERPL-MCNC: 11 MG/DL (ref 6–20)
BUN/CREAT SERPL: 17 (ref 12–20)
CALCIUM SERPL-MCNC: 7.9 MG/DL (ref 8.5–10.1)
CHLORIDE SERPL-SCNC: 104 MMOL/L (ref 97–108)
CO2 SERPL-SCNC: 25 MMOL/L (ref 21–32)
COMMENT, HOLDF: NORMAL
CREAT SERPL-MCNC: 0.66 MG/DL (ref 0.7–1.3)
DIFFERENTIAL METHOD BLD: ABNORMAL
EOSINOPHIL # BLD: 0 K/UL (ref 0–0.4)
EOSINOPHIL NFR BLD: 0 % (ref 0–7)
ERYTHROCYTE [DISTWIDTH] IN BLOOD BY AUTOMATED COUNT: 13.6 % (ref 11.5–14.5)
GLOBULIN SER CALC-MCNC: 5 G/DL (ref 2–4)
GLUCOSE BLD STRIP.AUTO-MCNC: 131 MG/DL (ref 65–117)
GLUCOSE SERPL-MCNC: 133 MG/DL (ref 65–100)
HCT VFR BLD AUTO: 42.6 % (ref 36.6–50.3)
HGB BLD-MCNC: 14 G/DL (ref 12.1–17)
IMM GRANULOCYTES # BLD AUTO: 0.1 K/UL (ref 0–0.04)
IMM GRANULOCYTES NFR BLD AUTO: 1 % (ref 0–0.5)
INR PPP: 1.1 (ref 0.9–1.1)
LYMPHOCYTES # BLD: 1.5 K/UL (ref 0.8–3.5)
LYMPHOCYTES NFR BLD: 10 % (ref 12–49)
MCH RBC QN AUTO: 27.8 PG (ref 26–34)
MCHC RBC AUTO-ENTMCNC: 32.9 G/DL (ref 30–36.5)
MCV RBC AUTO: 84.5 FL (ref 80–99)
MONOCYTES # BLD: 0.5 K/UL (ref 0–1)
MONOCYTES NFR BLD: 4 % (ref 5–13)
NEUTS SEG # BLD: 12.1 K/UL (ref 1.8–8)
NEUTS SEG NFR BLD: 85 % (ref 32–75)
NRBC # BLD: 0 K/UL (ref 0–0.01)
NRBC BLD-RTO: 0 PER 100 WBC
PLATELET # BLD AUTO: 338 K/UL (ref 150–400)
PMV BLD AUTO: 10.4 FL (ref 8.9–12.9)
POTASSIUM SERPL-SCNC: 6.7 MMOL/L (ref 3.5–5.1)
POTASSIUM SERPL-SCNC: 6.9 MMOL/L (ref 3.5–5.1)
PROT SERPL-MCNC: 8.6 G/DL (ref 6.4–8.2)
PROTHROMBIN TIME: 11 SEC (ref 9–11.1)
RBC # BLD AUTO: 5.04 M/UL (ref 4.1–5.7)
SAMPLES BEING HELD,HOLD: NORMAL
SERVICE CMNT-IMP: ABNORMAL
SODIUM SERPL-SCNC: 131 MMOL/L (ref 136–145)
TROPONIN I SERPL-MCNC: <0.05 NG/ML
WBC # BLD AUTO: 14.3 K/UL (ref 4.1–11.1)

## 2021-08-09 PROCEDURE — 74011000636 HC RX REV CODE- 636: Performed by: RADIOLOGY

## 2021-08-09 PROCEDURE — 70496 CT ANGIOGRAPHY HEAD: CPT

## 2021-08-09 PROCEDURE — 84132 ASSAY OF SERUM POTASSIUM: CPT

## 2021-08-09 PROCEDURE — 96374 THER/PROPH/DIAG INJ IV PUSH: CPT

## 2021-08-09 PROCEDURE — 85025 COMPLETE CBC W/AUTO DIFF WBC: CPT

## 2021-08-09 PROCEDURE — 71045 X-RAY EXAM CHEST 1 VIEW: CPT

## 2021-08-09 PROCEDURE — 84484 ASSAY OF TROPONIN QUANT: CPT

## 2021-08-09 PROCEDURE — 80053 COMPREHEN METABOLIC PANEL: CPT

## 2021-08-09 PROCEDURE — 99285 EMERGENCY DEPT VISIT HI MDM: CPT

## 2021-08-09 PROCEDURE — 85610 PROTHROMBIN TIME: CPT

## 2021-08-09 PROCEDURE — 82962 GLUCOSE BLOOD TEST: CPT

## 2021-08-09 PROCEDURE — 74011250636 HC RX REV CODE- 250/636: Performed by: STUDENT IN AN ORGANIZED HEALTH CARE EDUCATION/TRAINING PROGRAM

## 2021-08-09 PROCEDURE — 70450 CT HEAD/BRAIN W/O DYE: CPT

## 2021-08-09 PROCEDURE — 93005 ELECTROCARDIOGRAM TRACING: CPT

## 2021-08-09 PROCEDURE — 36415 COLL VENOUS BLD VENIPUNCTURE: CPT

## 2021-08-09 RX ORDER — ONDANSETRON 2 MG/ML
4 INJECTION INTRAMUSCULAR; INTRAVENOUS
Status: COMPLETED | OUTPATIENT
Start: 2021-08-09 | End: 2021-08-09

## 2021-08-09 RX ADMIN — ONDANSETRON 4 MG: 2 INJECTION INTRAMUSCULAR; INTRAVENOUS at 23:03

## 2021-08-09 RX ADMIN — IOPAMIDOL 100 ML: 755 INJECTION, SOLUTION INTRAVENOUS at 22:52

## 2021-08-10 ENCOUNTER — APPOINTMENT (OUTPATIENT)
Dept: NON INVASIVE DIAGNOSTICS | Age: 61
End: 2021-08-10
Attending: HOSPITALIST
Payer: COMMERCIAL

## 2021-08-10 ENCOUNTER — APPOINTMENT (OUTPATIENT)
Dept: MRI IMAGING | Age: 61
End: 2021-08-10
Attending: HOSPITALIST
Payer: COMMERCIAL

## 2021-08-10 PROBLEM — Z86.73 CEREBROVASCULAR ACCIDENT, OLD: Status: ACTIVE | Noted: 2021-08-10

## 2021-08-10 PROBLEM — D72.829 LEUKOCYTOSIS: Status: ACTIVE | Noted: 2021-08-10

## 2021-08-10 PROBLEM — R42 ACUTE SEVERE VERTIGO: Status: ACTIVE | Noted: 2021-08-10

## 2021-08-10 PROBLEM — G46.4 CEREBELLAR STROKE SYNDROME: Status: ACTIVE | Noted: 2021-08-10

## 2021-08-10 LAB
ANION GAP SERPL CALC-SCNC: 5 MMOL/L (ref 5–15)
APPEARANCE UR: CLEAR
APTT PPP: 27.4 SEC (ref 22.1–31)
BACTERIA URNS QL MICRO: NEGATIVE /HPF
BILIRUB UR QL: NEGATIVE
BUN SERPL-MCNC: 11 MG/DL (ref 6–20)
BUN/CREAT SERPL: 22 (ref 12–20)
CALCIUM SERPL-MCNC: 8.2 MG/DL (ref 8.5–10.1)
CHLORIDE SERPL-SCNC: 103 MMOL/L (ref 97–108)
CHOLEST SERPL-MCNC: 132 MG/DL
CO2 SERPL-SCNC: 28 MMOL/L (ref 21–32)
COLOR UR: ABNORMAL
CREAT SERPL-MCNC: 0.51 MG/DL (ref 0.7–1.3)
ECHO AO ASC DIAM: 3.63 CM
ECHO AO ROOT DIAM: 3.51 CM
ECHO AV AREA PEAK VELOCITY: 1.53 CM2
ECHO AV AREA PEAK VELOCITY: 1.67 CM2
ECHO AV AREA PEAK VELOCITY: 1.67 CM2
ECHO AV AREA PEAK VELOCITY: 1.82 CM2
ECHO AV AREA VTI: 1.74 CM2
ECHO AV MEAN GRADIENT: 8.19 MMHG
ECHO AV PEAK GRADIENT: 12.77 MMHG
ECHO AV PEAK GRADIENT: 15.22 MMHG
ECHO AV PEAK VELOCITY: 178.71 CM/S
ECHO AV PEAK VELOCITY: 195.05 CM/S
ECHO AV VTI: 43.69 CM
ECHO IVC PROX: 1.95 CM
ECHO LA AREA 4C: 24.33 CM2
ECHO LA MAJOR AXIS: 5.11 CM
ECHO LA VOL 2C: 53.1 ML (ref 18–58)
ECHO LA VOL 4C: 70.63 ML (ref 18–58)
ECHO LA VOL BP: 73.07 ML (ref 18–58)
ECHO LV E' LATERAL VELOCITY: 10.21 CENTIMETER/SECOND
ECHO LV E' SEPTAL VELOCITY: 9.32 CENTIMETER/SECOND
ECHO LV INTERNAL DIMENSION DIASTOLIC: 4.91 CM (ref 4.2–5.9)
ECHO LV INTERNAL DIMENSION SYSTOLIC: 3.36 CM
ECHO LV IVSD: 1.3 CM (ref 0.6–1)
ECHO LV MASS 2D: 255.9 G (ref 88–224)
ECHO LV POSTERIOR WALL DIASTOLIC: 1.31 CM (ref 0.6–1)
ECHO LVOT DIAM: 2 CM
ECHO LVOT PEAK GRADIENT: 3.59 MMHG
ECHO LVOT PEAK GRADIENT: 4.28 MMHG
ECHO LVOT PEAK VELOCITY: 103.43 CM/S
ECHO LVOT PEAK VELOCITY: 94.73 CM/S
ECHO LVOT SV: 75.9 ML
ECHO LVOT VTI: 24.11 CM
ECHO MV A VELOCITY: 91.2 CENTIMETER/SECOND
ECHO MV AREA PHT: 3 CM2
ECHO MV E DECELERATION TIME (DT): 252.97 MS
ECHO MV E VELOCITY: 75.76 CENTIMETER/SECOND
ECHO MV PRESSURE HALF TIME (PHT): 73.36 MS
ECHO PV MAX VELOCITY: 101.63 CM/S
ECHO PV PEAK INSTANTANEOUS GRADIENT SYSTOLIC: 4.25 MMHG
ECHO RV INTERNAL DIMENSION: 4.09 CM
ECHO RV TAPSE: 2.7 CM (ref 1.5–2)
ECHO TV REGURGITANT MAX VELOCITY: 255.98 CM/S
ECHO TV REGURGITANT PEAK GRADIENT: 26.21 MMHG
EPITH CASTS URNS QL MICRO: ABNORMAL /LPF
ERYTHROCYTE [DISTWIDTH] IN BLOOD BY AUTOMATED COUNT: 13.3 % (ref 11.5–14.5)
GLUCOSE SERPL-MCNC: 118 MG/DL (ref 65–100)
GLUCOSE UR STRIP.AUTO-MCNC: NEGATIVE MG/DL
HCT VFR BLD AUTO: 40.8 % (ref 36.6–50.3)
HCYS SERPL-SCNC: 3.5 UMOL/L (ref 3.7–13.9)
HDLC SERPL-MCNC: 43 MG/DL
HDLC SERPL: 3.1 {RATIO} (ref 0–5)
HGB BLD-MCNC: 13.5 G/DL (ref 12.1–17)
HGB UR QL STRIP: NEGATIVE
KETONES UR QL STRIP.AUTO: NEGATIVE MG/DL
LA VOL DISK BP: 68.92 ML (ref 18–58)
LDLC SERPL CALC-MCNC: 78.4 MG/DL (ref 0–100)
LEUKOCYTE ESTERASE UR QL STRIP.AUTO: NEGATIVE
LVOT MG: 2.13 MMHG
MAGNESIUM SERPL-MCNC: 2.2 MG/DL (ref 1.6–2.4)
MCH RBC QN AUTO: 27.9 PG (ref 26–34)
MCHC RBC AUTO-ENTMCNC: 33.1 G/DL (ref 30–36.5)
MCV RBC AUTO: 84.3 FL (ref 80–99)
NITRITE UR QL STRIP.AUTO: NEGATIVE
NRBC # BLD: 0 K/UL (ref 0–0.01)
NRBC BLD-RTO: 0 PER 100 WBC
PH UR STRIP: 7 [PH] (ref 5–8)
PHOSPHATE SERPL-MCNC: 3.8 MG/DL (ref 2.6–4.7)
PLATELET # BLD AUTO: 355 K/UL (ref 150–400)
PMV BLD AUTO: 10.2 FL (ref 8.9–12.9)
POTASSIUM SERPL-SCNC: 3.8 MMOL/L (ref 3.5–5.1)
POTASSIUM SERPL-SCNC: 3.8 MMOL/L (ref 3.5–5.1)
PROT UR STRIP-MCNC: 30 MG/DL
RBC # BLD AUTO: 4.84 M/UL (ref 4.1–5.7)
RBC #/AREA URNS HPF: ABNORMAL /HPF (ref 0–5)
SODIUM SERPL-SCNC: 136 MMOL/L (ref 136–145)
SP GR UR REFRACTOMETRY: 1.01 (ref 1–1.03)
THERAPEUTIC RANGE,PTTT: NORMAL SECS (ref 58–77)
TRIGL SERPL-MCNC: 53 MG/DL (ref ?–150)
TROPONIN I SERPL-MCNC: <0.05 NG/ML
TSH SERPL DL<=0.05 MIU/L-ACNC: 0.45 UIU/ML (ref 0.36–3.74)
UR CULT HOLD, URHOLD: NORMAL
UROBILINOGEN UR QL STRIP.AUTO: 2 EU/DL (ref 0.2–1)
VLDLC SERPL CALC-MCNC: 10.6 MG/DL
WBC # BLD AUTO: 13.9 K/UL (ref 4.1–11.1)
WBC URNS QL MICRO: ABNORMAL /HPF (ref 0–4)

## 2021-08-10 PROCEDURE — 74011250637 HC RX REV CODE- 250/637: Performed by: HOSPITALIST

## 2021-08-10 PROCEDURE — 36415 COLL VENOUS BLD VENIPUNCTURE: CPT

## 2021-08-10 PROCEDURE — 97116 GAIT TRAINING THERAPY: CPT

## 2021-08-10 PROCEDURE — 84100 ASSAY OF PHOSPHORUS: CPT

## 2021-08-10 PROCEDURE — 84443 ASSAY THYROID STIM HORMONE: CPT

## 2021-08-10 PROCEDURE — 99218 HC RM OBSERVATION: CPT

## 2021-08-10 PROCEDURE — 74011250636 HC RX REV CODE- 250/636: Performed by: HOSPITALIST

## 2021-08-10 PROCEDURE — 80048 BASIC METABOLIC PNL TOTAL CA: CPT

## 2021-08-10 PROCEDURE — 85027 COMPLETE CBC AUTOMATED: CPT

## 2021-08-10 PROCEDURE — 83735 ASSAY OF MAGNESIUM: CPT

## 2021-08-10 PROCEDURE — 80061 LIPID PANEL: CPT

## 2021-08-10 PROCEDURE — 83090 ASSAY OF HOMOCYSTEINE: CPT

## 2021-08-10 PROCEDURE — 85730 THROMBOPLASTIN TIME PARTIAL: CPT

## 2021-08-10 PROCEDURE — 93306 TTE W/DOPPLER COMPLETE: CPT | Performed by: INTERNAL MEDICINE

## 2021-08-10 PROCEDURE — 70551 MRI BRAIN STEM W/O DYE: CPT

## 2021-08-10 PROCEDURE — 96375 TX/PRO/DX INJ NEW DRUG ADDON: CPT

## 2021-08-10 PROCEDURE — 97161 PT EVAL LOW COMPLEX 20 MIN: CPT

## 2021-08-10 PROCEDURE — 99214 OFFICE O/P EST MOD 30 MIN: CPT | Performed by: PSYCHIATRY & NEUROLOGY

## 2021-08-10 PROCEDURE — 97165 OT EVAL LOW COMPLEX 30 MIN: CPT | Performed by: OCCUPATIONAL THERAPIST

## 2021-08-10 PROCEDURE — 74011250636 HC RX REV CODE- 250/636: Performed by: STUDENT IN AN ORGANIZED HEALTH CARE EDUCATION/TRAINING PROGRAM

## 2021-08-10 PROCEDURE — 96376 TX/PRO/DX INJ SAME DRUG ADON: CPT

## 2021-08-10 PROCEDURE — 74011250637 HC RX REV CODE- 250/637: Performed by: INTERNAL MEDICINE

## 2021-08-10 PROCEDURE — 81001 URINALYSIS AUTO W/SCOPE: CPT

## 2021-08-10 PROCEDURE — 96374 THER/PROPH/DIAG INJ IV PUSH: CPT

## 2021-08-10 RX ORDER — SODIUM CHLORIDE 9 MG/ML
75 INJECTION, SOLUTION INTRAVENOUS CONTINUOUS
Status: DISCONTINUED | OUTPATIENT
Start: 2021-08-10 | End: 2021-08-10

## 2021-08-10 RX ORDER — LORAZEPAM 2 MG/ML
1 INJECTION INTRAMUSCULAR ONCE
Status: COMPLETED | OUTPATIENT
Start: 2021-08-10 | End: 2021-08-10

## 2021-08-10 RX ORDER — ACETAMINOPHEN 650 MG/1
650 SUPPOSITORY RECTAL
Status: DISCONTINUED | OUTPATIENT
Start: 2021-08-10 | End: 2021-08-11 | Stop reason: HOSPADM

## 2021-08-10 RX ORDER — KETOROLAC TROMETHAMINE 30 MG/ML
15 INJECTION, SOLUTION INTRAMUSCULAR; INTRAVENOUS
Status: COMPLETED | OUTPATIENT
Start: 2021-08-10 | End: 2021-08-10

## 2021-08-10 RX ORDER — ASPIRIN 81 MG/1
325 TABLET ORAL DAILY
Status: ON HOLD | COMMUNITY
End: 2021-09-03 | Stop reason: ALTCHOICE

## 2021-08-10 RX ORDER — METOPROLOL SUCCINATE 50 MG/1
100 TABLET, EXTENDED RELEASE ORAL DAILY
Status: DISCONTINUED | OUTPATIENT
Start: 2021-08-10 | End: 2021-08-11 | Stop reason: HOSPADM

## 2021-08-10 RX ORDER — FACIAL-BODY WIPES
10 EACH TOPICAL DAILY PRN
Status: DISCONTINUED | OUTPATIENT
Start: 2021-08-10 | End: 2021-08-11 | Stop reason: HOSPADM

## 2021-08-10 RX ORDER — METOPROLOL SUCCINATE 25 MG/1
100 TABLET, EXTENDED RELEASE ORAL DAILY
Status: DISCONTINUED | OUTPATIENT
Start: 2021-08-11 | End: 2021-08-10

## 2021-08-10 RX ORDER — BUTALBITAL, ACETAMINOPHEN AND CAFFEINE 50; 325; 40 MG/1; MG/1; MG/1
2 TABLET ORAL
Status: DISCONTINUED | OUTPATIENT
Start: 2021-08-10 | End: 2021-08-11 | Stop reason: HOSPADM

## 2021-08-10 RX ORDER — ATORVASTATIN CALCIUM 20 MG/1
80 TABLET, FILM COATED ORAL
Status: DISCONTINUED | OUTPATIENT
Start: 2021-08-10 | End: 2021-08-11 | Stop reason: HOSPADM

## 2021-08-10 RX ORDER — ACETAMINOPHEN 325 MG/1
650 TABLET ORAL
Status: DISCONTINUED | OUTPATIENT
Start: 2021-08-10 | End: 2021-08-11 | Stop reason: HOSPADM

## 2021-08-10 RX ORDER — MECLIZINE HYDROCHLORIDE 25 MG/1
25 TABLET ORAL 3 TIMES DAILY
Status: DISCONTINUED | OUTPATIENT
Start: 2021-08-10 | End: 2021-08-11 | Stop reason: HOSPADM

## 2021-08-10 RX ORDER — METOPROLOL SUCCINATE 100 MG/1
100 TABLET, EXTENDED RELEASE ORAL DAILY
COMMUNITY
End: 2021-09-13 | Stop reason: ALTCHOICE

## 2021-08-10 RX ORDER — ATORVASTATIN CALCIUM 20 MG/1
40 TABLET, FILM COATED ORAL
Status: DISCONTINUED | OUTPATIENT
Start: 2021-08-10 | End: 2021-08-10

## 2021-08-10 RX ORDER — ONDANSETRON 2 MG/ML
4 INJECTION INTRAMUSCULAR; INTRAVENOUS
Status: DISCONTINUED | OUTPATIENT
Start: 2021-08-10 | End: 2021-08-11 | Stop reason: HOSPADM

## 2021-08-10 RX ORDER — ASPIRIN 325 MG
325 TABLET, DELAYED RELEASE (ENTERIC COATED) ORAL DAILY
Status: DISCONTINUED | OUTPATIENT
Start: 2021-08-11 | End: 2021-08-11 | Stop reason: HOSPADM

## 2021-08-10 RX ORDER — GUAIFENESIN 100 MG/5ML
81 LIQUID (ML) ORAL DAILY
Status: DISCONTINUED | OUTPATIENT
Start: 2021-08-10 | End: 2021-08-10

## 2021-08-10 RX ORDER — LABETALOL HCL 20 MG/4 ML
5 SYRINGE (ML) INTRAVENOUS
Status: DISCONTINUED | OUTPATIENT
Start: 2021-08-10 | End: 2021-08-11 | Stop reason: HOSPADM

## 2021-08-10 RX ADMIN — MECLIZINE HYDROCHLORIDE 25 MG: 25 TABLET ORAL at 08:50

## 2021-08-10 RX ADMIN — ONDANSETRON 4 MG: 2 INJECTION INTRAMUSCULAR; INTRAVENOUS at 05:45

## 2021-08-10 RX ADMIN — METOPROLOL SUCCINATE 100 MG: 50 TABLET, EXTENDED RELEASE ORAL at 14:31

## 2021-08-10 RX ADMIN — MECLIZINE HYDROCHLORIDE 25 MG: 25 TABLET ORAL at 21:04

## 2021-08-10 RX ADMIN — KETOROLAC TROMETHAMINE 15 MG: 30 INJECTION, SOLUTION INTRAMUSCULAR; INTRAVENOUS at 02:33

## 2021-08-10 RX ADMIN — ACETAMINOPHEN 650 MG: 325 TABLET ORAL at 14:31

## 2021-08-10 RX ADMIN — LORAZEPAM 1 MG: 2 INJECTION INTRAMUSCULAR; INTRAVENOUS at 08:50

## 2021-08-10 RX ADMIN — MECLIZINE HYDROCHLORIDE 25 MG: 25 TABLET ORAL at 16:55

## 2021-08-10 RX ADMIN — SODIUM CHLORIDE 75 ML/HR: 9 INJECTION, SOLUTION INTRAVENOUS at 05:45

## 2021-08-10 RX ADMIN — ATORVASTATIN CALCIUM 80 MG: 20 TABLET, FILM COATED ORAL at 21:04

## 2021-08-10 RX ADMIN — ASPIRIN 81 MG: 81 TABLET, CHEWABLE ORAL at 08:50

## 2021-08-10 RX ADMIN — ACETAMINOPHEN 650 MG: 325 TABLET ORAL at 05:46

## 2021-08-10 NOTE — H&P
[address]  [phone]     Hospitalist Admission Note      NAME: Dior Rosenberg   :  1960   MRN:  [id number]     Date/Time:  8/10/2021 3:49 AM    Patient PCP: Masha Moreland MD    Emergency Contact:    Extended Emergency Contact Information  Primary Emergency Contact: Genna Anand  Home Phone: [phone]  Relation: Spouse      Code: FULL    Isolation Precautions: There are currently no Active Isolations        Subjective:     CHIEF COMPLAINT: Vertigo     HISTORY OF PRESENT ILLNESS:     Mr. Corrine Maharaj is a 59 y.o. male with history that includes HTN and left-sided thalamic CVA back of the  of last month presents after experiencing a sudden episode of dizziness/vertiginous symptoms while [place] [employer]. Initially was mild and progressively got worse by the time he was at home. Symptoms were exacerbated with movement. He had an occipital headache and left-sided neck pain. Had nausea with dry heaving especially when he would try to sit up. No other neurological deficits or symptoms associated with this. Currently he is feeling better with only mild symptoms. In ER he he was noted to have a mild hyponatremia on the labs along with a leukocytosis 14.3 and CT and CTA only showed the CVA from the previous visit no new findings. Wife Fredi Marks at bedside assisting with history. No Known Allergies    Prior to Admission medications    Medication Sig Start Date End Date Taking? Authorizing Provider   ibuprofen (MOTRIN) 200 mg tablet Take 600 mg by mouth daily as needed for Pain. Provider, Historical   aspirin 81 mg chewable tablet Take 1 Tablet by mouth daily for 30 days. 21  Marla Howard MD   atorvastatin (LIPITOR) 80 mg tablet Take 1 Tablet by mouth nightly for 30 days.  7/16/21 8/15/21  Marla Howard MD   metroprolol succinate 100 mg      PMH:  CVA last month  HTN      PSH:  None    Social History     Tobacco Use    Smoking status: Never   Substance Use Topics    Alcohol use: Occasionally        FH: Mother 80 and health  Father CAD, CABG , DM, CKD, Ataxia  No history of CVA    Review of Systems (14 point ROS):    Gen:   malaise, denies chills and denies fevers  Eyes:  negative  ENT:    negative  Resp:  negative  CVS:   negative  GI:       negative  :     negative  Skin:   negative  Hem:   negative  MS:     negative  Patria:    dizziness, headaches, denies diff. speech, denies numbness, denies weakness and denies seizures   Psy:    negative  Endo:  negative  ALL:   negative         Objective:      Visit Vitals  BP (!) 165/101   Pulse 66   Resp 20   SpO2 93%       Exam:     Physical Exam:    General: alert, well appearing and no distress  Head: Normocephalic, without obvious abnormality, atraumatic  Eyes: PERRL, EOMI, anicteric sclerae and conjuntiva clear  ENT: Lips, mucosa, and tongue normal.   Neck: normal, supple and no tenderness  Lungs: clear to auscultation with good breath sounds and normal respiratory effort  Heart: S1, S2 normal, regular rate and regular rhythm  Abd: not distended, soft, nontender, BS present and normactive  Ext: no cyanosis and no edema  Pulses: present 2+ and symmetric  Skin: normal skin color, no rashes and texture normal  Neuro: Alert and oriented x4. Cranial nerves II through XII appear to be grossly intact except that there may be a slight left facial droop but somewhat difficult to tell because of the mustache and beard. Good finger-to-nose coordination. No nystagmus. DTRs was somewhat difficult to obtain due to patient's inability to fully relax.   Psych: not anxious, cooperative, appropriate affect    Labs:    Recent Labs     08/09/21 2130   WBC 14.3*   HGB 14.0   HCT 42.6        Recent Labs     08/09/21  2307 08/09/21 2130 08/09/21 2130   NA  --   --  131*   K 3.8   < > 6.9*  6.7*   CL  --   --  104   CO2  --   -- 25   GLU  --   --  133*   BUN  --   --  11   CREA  --   --  0.66*   CA  --   --  7.9*   ALB  --   --  3.6   TBILI  --   --  1.0   ALT  --   --  33    < > = values in this interval not displayed. EKG reviewed:         Radiology:    CT HEAD WO CONT    Result Date: 8/9/2021  No acute findings. Subtle chronic left thalamic lacunar infarct. XR CHEST PORT    Result Date: 8/9/2021   impression: No acute infiltrate. I personally reviewed and interpreted the imaging studies and agree with official reading. Discussed with patient and wife. The chart, ER course, the above PMSFH, lab work, and radiological studies was reviewed by me on: August 10, 2021         Assessment/Plan:      Acute severe vertigo with concerns for possible cerebellar stroke syndrome / Cerebrovascular accident, old:  Admit for further work-up and treatment. Stroke order set. Permissive hypertension for the first 24 hours. NPO until bedside evaluation is performed. Oxygen per protocol with pulse oximetry. Aspirin, statin, antiemetics, meclizine, and supportive care. Labs:  CMP, CBC, A1C, Lipid, TSH, consider homocysteine  Imaging studies: MRI of brain and echocardiogram to rule out possible cardioembolic cause. Consults: Neurology, speech eval, PT OT, pharmacy for med rec. Leukocytosis: Not sure of cause at this time may be reactive due to stress. Should consider following labs. Do not see any evidence of infection for work-up. NB: Ordered Ativan 1 mg IV prior to MRI at the request of the patient due to claustrophobia.     Risk of deterioration: high      Discussed:  Code Status and Care Plan discussed with: Patient, Spouse at bedside , ED Provider and RN    Prophylaxis:  SCD's    Probable disposition:  Home with family    Total time: 79 Minutes **I personally saw and examined the patient during this time period**    Date of service:    8/10/2021                ___________________________________________________    Admitting Physician: Clarice Salas MD

## 2021-08-10 NOTE — PROGRESS NOTES
8/10/2021  11:38 AM    Case management note    Reason for Admission:  Acute severe vertigo    7/15/- 7/16/2021 CVA  Patient was here for CVA on above dates. Patient is independent,  and drives. He has history of HTN                     RUR Score:          low           Plan for utilizing home health:   PT/OT to eveal       PCP: First and Last name:  Karen Tamez MD     Name of Practice:    Are you a current patient: Yes/No:    Approximate date of last visit: 7/19   Can you participate in a virtual visit with your PCP:                     Current Advanced Directive/Advance Care Plan: Full Code NO AD, would like to complete while hospitalized      Healthcare Decision Maker:   Maddie Charlton                              Transition of Care Plan:               1. Home with family assistance  2. PCP follow up  3. AD planning  4. CM to follow for discharge needs  Care Management Interventions  PCP Verified by CM:  Yes (Dr. Jenifer Yepez)  Mode of Transport at Discharge: Self  Current Support Network: Lives with Spouse  Confirm Follow Up Transport: Family  The Plan for Transition of Care is Related to the Following Treatment Goals : severe vertigo  Discharge Location  Discharge Placement: Home with family assistance            Zuleika Wells

## 2021-08-10 NOTE — ED NOTES
Bedside and Verbal shift change report given to Juliet Hidalgo RN (oncoming nurse) by David Castellon RN (offgoing nurse). Report included the following information SBAR, Kardex, ED Summary, [facility] and Recent Results.

## 2021-08-10 NOTE — PROGRESS NOTES
Problem: Mobility Impaired (Adult and Pediatric)  Goal: *Acute Goals and Plan of Care (Insert Text)  Description: FUNCTIONAL STATUS PRIOR TO ADMISSION: Patient was independent and active without use of DME. CVA in July of this year. Works as a . HOME SUPPORT PRIOR TO ADMISSION: The patient lived with wife but did not require assist.    Physical Therapy Goals  Initiated 8/10/2021  1. Patient will move from supine to sit and sit to supine , scoot up and down, and roll side to side in bed with independence within 7 day(s). 2.  Patient will transfer from bed to chair and chair to bed with independence using the least restrictive device within 7 day(s). 3.  Patient will perform sit to stand with independence within 7 day(s). 4.  Patient will ambulate with independence for 350 feet with the least restrictive device within 7 day(s). 5.  Patient will ascend/descend 5 stairs with 1 handrail(s) with independence within 7 day(s). Outcome: Progressing Towards Goal   PHYSICAL THERAPY EVALUATION  Patient: Giselle Perrin (59 y.o. male)  Date: 8/10/2021  Primary Diagnosis: Acute severe vertigo [R42]  Cerebrovascular accident, old [Z86.73]  Leukocytosis [D72.829]        Precautions:   Fall    ASSESSMENT  Based on the objective data described below, the patient presents with impaired balance with c/o dizziness and at an overall CGA-Min A level for functional mobility. Pt with recent L thalamic CVA in July. Re-educated pt on signs and symptoms of stroke using BE FAST as they are currently r/o new CVA. He verbalized fair understanding. CT of head and MRI negative for acute changes. Noted right beating nystagmus at rest and end range with tracking to the R. Pt does not currently endorse dizziness in sitting, with stationary tracking or head turns once again while seated.  Does report increased symptoms of environment \"revolving counter clockwise like a record\" during upright mobility while turning and when eyes are closed after ambulation. Pt moves slowly and cautiously and displays three small LOB during turning. Improved safety and balance when cued to focus on distance object when walking in a straight line and before turning. Will benefit from continued acute PT and will further assess BPPV testing next session as tech arriving to take pt to MRI. Would benefit from OP PT if symptoms do not improve acutely. Current Level of Function Impacting Discharge (mobility/balance): Min A for stabilizing d/t dizziness    Functional Outcome Measure: The patient scored 15/28 on the Tinetti outcome measure which is indicative of high fall risk. Other factors to consider for discharge: continued dizziniess     Patient will benefit from skilled therapy intervention to address the above noted impairments. PLAN :  Recommendations and Planned Interventions: bed mobility training, transfer training, gait training, therapeutic exercises, neuromuscular re-education, patient and family training/education, and therapeutic activities      Frequency/Duration: Patient will be followed by physical therapy:  5 times a week to address goals. Recommendation for discharge: (in order for the patient to meet his/her long term goals)  Outpatient physical therapy follow up recommended for neuro re education    This discharge recommendation:  Has been made in collaboration with the attending provider and/or case management    IF patient discharges home will need the following DME: none         SUBJECTIVE:   Patient stated It still feels like the record is moving.     OBJECTIVE DATA SUMMARY:   HISTORY:    No past medical history on file. No past surgical history on file.     Personal factors and/or comorbidities impacting plan of care: CVA last month    Home Situation  Home Environment: Private residence  # Steps to Enter: 5  Rails to Enter: Yes  Hand Rails : Bilateral  One/Two Story Residence: Two story, live on 1st floor  Patient Expects to be Discharged to[de-identified] House  Current DME Used/Available at Home: Crutches  Tub or Shower Type: Tub/Shower combination    EXAMINATION/PRESENTATION/DECISION MAKING:   Critical Behavior:  Neurologic State: Alert  Orientation Level: Oriented X4  Cognition: Appropriate decision making  Safety/Judgement: Awareness of environment, Fall prevention, Insight into deficits  Hearing: Auditory  Auditory Impairment: None  Skin:    Edema:   Range Of Motion:  AROM: Within functional limits           PROM: Within functional limits           Strength:    Strength: Within functional limits                    Tone & Sensation:   Tone: Normal              Sensation: Intact               Coordination:  Coordination: Grossly decreased, non-functional  Vision:   Acuity: Able to read clock/calendar on wall without difficulty; Able to read employee name badge without difficulty  Corrective Lenses: Reading glasses  Functional Mobility:  Bed Mobility:  Rolling: Independent  Supine to Sit: Independent  Sit to Supine: Independent  Scooting: Independent  Transfers:  Sit to Stand: Stand-by assistance  Stand to Sit: Stand-by assistance        Bed to Chair: Contact guard assistance              Balance:   Sitting: Intact  Standing: Impaired  Standing - Static: Fair  Standing - Dynamic : Fair  Ambulation/Gait Training:  Distance (ft): 110 Feet (ft)  Assistive Device: Gait belt  Ambulation - Level of Assistance: Contact guard assistance;Minimal assistance        Gait Abnormalities: Altered arm swing;Decreased step clearance (high guard UEs)        Base of Support: Widened     Speed/Tiff: Slow;Pace decreased (<100 feet/min); Delayed  Step Length: Right shortened;Left shortened                 Functional Measure:  Tinetti test:    Sitting Balance: 1  Arises: 1  Attempts to Rise: 2  Immediate Standing Balance: 0  Standing Balance: 0  Nudged: 2  Eyes Closed: 1  Turn 360 Degrees - Continuous/Discontinuous: 0  Turn 360 Degrees - Steady/Unsteady: 0  Sitting Down: 1  Balance Score: 8 Balance total score  Indication of Gait: 0  R Step Length/Height: 1  L Step Length/Height: 1  R Foot Clearance: 1  L Foot Clearance: 1  Step Symmetry: 1  Step Continuity: 1  Path: 1  Trunk: 0  Walking Time: 0  Gait Score: 7 Gait total score  Total Score: 15/28 Overall total score         Tinetti Tool Score Risk of Falls  <19 = High Fall Risk  19-24 = Moderate Fall Risk  25-28 = Low Fall Risk  Tinetti ME. Performance-Oriented Assessment of Mobility Problems in Elderly Patients. [facility] 66; [id number]. (Scoring Description: PT Bulletin Feb. 10, 1993)    Older adults: Diandrafinesse Sands et al, 2009; n = 1000 [facility] elderly evaluated with ABC, RAGINI, ADL, and IADL)  · Mean RAGINI score for males aged 69-68 years = 26.21(3.40)  · Mean RAGINI score for females age 69-68 years = 25.16(4.30)  · Mean RAGINI score for males over 80 years = 23.29(6.02)  · Mean RAGINI score for females over 80 years = 17.20(8.32)            Physical Therapy Evaluation Charge Determination   History Examination Presentation Decision-Making   MEDIUM  Complexity : 1-2 comorbidities / personal factors will impact the outcome/ POC  MEDIUM Complexity : 3 Standardized tests and measures addressing body structure, function, activity limitation and / or participation in recreation  LOW Complexity : Stable, uncomplicated  Other outcome measures Tinetti  LOW       Based on the above components, the patient evaluation is determined to be of the following complexity level: LOW     Pain Rating:  none    Activity Tolerance:   Good    After treatment patient left in no apparent distress:   Supine in bed, Call bell within reach, and Caregiver / family present    COMMUNICATION/EDUCATION:   The patients plan of care was discussed with: Registered nurse. Fall prevention education was provided and the patient/caregiver indicated understanding., Patient/family have participated as able in goal setting and plan of care. , and Patient/family agree to work toward stated goals and plan of care.     Thank you for this referral.  Kaden Short, PT   Time Calculation: 35 mins

## 2021-08-10 NOTE — PROGRESS NOTES
OCCUPATIONAL THERAPY EVALUATION/DISCHARGE  Patient: Donna Miranda (5 y.o. male)  Date: 8/10/2021  Primary Diagnosis: Acute severe vertigo [R42]  Cerebrovascular accident, old [Z86.73]  Leukocytosis [D72.829]       Precautions:  Fall    ASSESSMENT  Based on the objective data described below, the patient presents with impaired balance with c/o dizziness and at an overall CGA level with LE ADLs, toileting and functional mobility. He is moving slow and cautious and was able to self correct all LOBs during ADLs. No further skilled acute OT required at this time. Will defer to PT for balance with mobility issues. Pt with recent L thalamic CVA in July. Re-educated pt on signs and symptoms of stroke using BE FAST as they are currently r/o new CVA. He verbalized fair understanding. CT of head negative and pt awaiting MRI. Current Level of Function (ADLs/self-care): CGA and set-up    Functional Outcome Measure: The patient scored Total A-D  Total A-D (Motor Function): 66/66 on the Fugl-Canela Assessment which is indicative of no impairment in upper extremity functional status. Other factors to consider for discharge: see above     PLAN :  Recommendation for discharge: (in order for the patient to meet his/her long term goals)  No skilled occupational therapy/ follow up rehabilitation needs identified at this time. This discharge recommendation:  Has not yet been discussed the attending provider and/or case management    IF patient discharges home will need the following DME:none for OT       SUBJECTIVE:   Patient stated I feel off.     OBJECTIVE DATA SUMMARY:   HISTORY:   No past medical history on file. No past surgical history on file.     Prior Level of Function/Environment/Context: Independent, active, drives, works as a  and worked just prior to admission  Expanded or extensive additional review of patient history:     Home Situation  Home Environment: Private residence  # Steps to Enter: 5  Rails to Enter: Yes  Hand Rails : Bilateral  One/Two Story Residence: Two story, live on 1st floor  Patient Expects to be Discharged to[de-identified] House  Current DME Used/Available at Home: Crutches  Tub or Shower Type: Tub/Shower combination    Hand dominance: Right    EXAMINATION OF PERFORMANCE DEFICITS:  Cognitive/Behavioral Status:  Neurologic State: Alert; Appropriate for age  Orientation Level: Oriented X4  Cognition: Appropriate decision making; Appropriate for age attention/concentration; Follows commands  Perception: Appears intact  Perseveration: No perseveration noted  Safety/Judgement: Awareness of environment; Fall prevention; Insight into deficits    Hearing: Auditory  Auditory Impairment: None    Vision/Perceptual:    Acuity: Able to read clock/calendar on wall without difficulty; Able to read employee name badge without difficulty    Corrective Lenses: Reading glasses    Range of Motion:  AROM: Within functional limits  PROM: Within functional limits                      Strength:  Strength: Within functional limits (BUEs 5/5)                Coordination:  Coordination: Within functional limits  Fine Motor Skills-Upper: Left Intact; Right Intact    Gross Motor Skills-Upper: Left Intact; Right Intact    Tone & Sensation:  Tone: Normal  Sensation: Intact                      Balance:  Sitting: Intact  Standing: Intact    Functional Mobility and Transfers for ADLs:  Bed Mobility:  Rolling: Independent  Supine to Sit: Independent  Sit to Supine: Independent  Scooting: Independent    Transfers:  Sit to Stand: Stand-by assistance  Stand to Sit: Independent;Stand-by assistance  Bed to Chair: Contact guard assistance  Bathroom Mobility: Contact guard assistance  Toilet Transfer : Contact guard assistance  Assistive Device : Gait Belt    ADL Assessment:  Feeding: Independent    Oral Facial Hygiene/Grooming: Contact guard assistance; Additional time (standing)    Bathing: Contact guard assistance    Upper Body Dressing: Setup    Lower Body Dressing: Contact guard assistance    Toileting: Contact guard assistance                ADL Intervention and task modifications:  Patient instructed and indicated understanding energy conservation techniques to increase independence and safety during ADLs. Cognitive Retraining  Safety/Judgement: Awareness of environment; Fall prevention; Insight into deficits      Functional Measure:  Fugl-Canela Assessment of Motor Recovery after Stroke: BUEs  Reflex Activity  Flexors/Biceps/Fingers: Can be elicited  Extensors/Triceps: Can be elicited  Reflex Subtotal: 4    Volitional Movement Within Synergies  Shoulder Retraction: Full  Shoulder Elevation: Full  Shoulder Abduction (90 degrees): Full  Shoulder External Rotation: Full  Elbow Flexion: Full  Forearm Supination: Full  Shoulder Adduction/Internal Rotation: Full  Elbow Extension: Full  Forearm Pronation: Full  Subtotal: 18    Volitional Movement Mixing Synergies  Hand to Lumbar Spine: Full  Shoulder Flexion (0-90 degrees): Full  Pronation-Supination: Full  Subtotal: 6    Volitional Movement With Little or No Synergy  Shoulder Abduction (0-90 degrees): Full  Shoulder Flexion ( degrees): Full  Pronation/Supination: Full  Subtotal : 6    Normal Reflex Activity  Biceps, Triceps, Finger Flexors:  Full  Subtotal : 2    Upper Extremity Total   Upper Extremity Total: 36    Wrist  Stability at 15 Degree Dorsiflexion: Full  Repeated Dorsiflexion/ Volar Flexion: Full  Stability at 15 Degree Dorsiflexion: Full  Repeated Dorsiflexion/ Volar Flexion: Full  Circumduction: Full  Wrist Total: 10    Hand  Mass Flexion: Full  Mass Extension: Full  Grasp A: Full  Grasp B: Full  Grasp C: Full  Grasp D: Full  Grasp E: Full  Hand Total: 14    Coordination/Speed  Tremor: None  Dysmetria: None  Time: <1s (BUEs in 5 sec)  Coordination/Speed Total : 6    Total A-D  Total A-D (Motor Function): 66/66     This is a reliable/valid measure of arm function after a neurological event. It has established value to characterize functional status and for measuring spontaneous and therapy-induced recovery; tests proximal and distal motor functions. Fugl-Canela Assessment  UE scores recorded between five and 30 days post neurologic event can be used to predict UE recovery at six months post neurologic event. Severe = 0-21 points   Moderately Severe = 22-33 points   Moderate = 34-47 points   Mild = 48-66 points  MICHELLE Nixon, ANNETTE Alvarez, & ILIA Bond (1992). Measurement of motor recovery after stroke: Outcome assessment and sample size requirements. Stroke, 23, pp. 8318-2476.   ------------------------------------------------------------------------------------------------------------------------------------------------------------------  MCID:  Stroke:   Rand Primrose et al, 2001; n = 171; mean age 79 (5) years; assessed within 16 (12) days of stroke, Acute Stroke)  FMA Motor Scores from Admission to Discharge   10 point increase in FMA Upper Extremity = 1.5 change in discharge FIM   10 point increase in FMA Lower Extremity = 1.9 change in discharge FIM  MDC:   Stroke:   Susanna Hodge et al, 2008, n = 14, mean age = 59.9 (14.6) years, assessed on average 14 (6.5) months post stroke, Chronic Stroke)   FMA = 5.2 points for the Upper Extremity portion of the assessment     Barthel Index:    Bathin  Bladder: 10  Bowels: 10  Groomin  Dressin  Feeding: 10  Mobility: 10  Stairs: 5  Toilet Use: 5  Transfer (Bed to Chair and Back): 10  Total: 70/100        The Barthel ADL Index: Guidelines  1. The index should be used as a record of what a patient does, not as a record of what a patient could do. 2. The main aim is to establish degree of independence from any help, physical or verbal, however minor and for whatever reason. 3. The need for supervision renders the patient not independent. 4. A patient's performance should be established using the best available evidence. Asking the patient, friends/relatives and nurses are the usual sources, but direct observation and common sense are also important. However direct testing is not needed. 5. Usually the patient's performance over the preceding 24-48 hours is important, but occasionally longer periods will be relevant. 6. Middle categories imply that the patient supplies over 50 per cent of the effort. 7. Use of aids to be independent is allowed. Vilma Castañeda., Barthel, D.W. ([id number]). Functional evaluation: the Barthel Index. [address] [facility] (14)2. Tete Vazquez ct MAGALI Astorga, Peace Greene., [facility]., [place], [address] (). Measuring the change indisability after inpatient rehabilitation; comparison of the responsiveness of the Barthel Index and Functional [place] Measure. Journal of Neurology, Neurosurgery, and Psychiatry, 66(4), 831-054. Neelima Peña, BRYSON, CHIQUITA Ayers, & Pete Mcdonough M.A. (2004.) Assessment of post-stroke quality of life in cost-effectiveness studies: The usefulness of the Barthel Index and the EuroQoL-5D. Quality of Life Research, 15, 733-97     Occupational Therapy Evaluation Charge Determination   History Examination Decision-Making   LOW Complexity : Brief history review  MEDIUM Complexity : 3-5 performance deficits relating to physical, cognitive , or psychosocial skils that result in activity limitations and / or participation restrictions MEDIUM Complexity : Patient may present with comorbidities that affect occupational performnce.  Miniml to moderate modification of tasks or assistance (eg, physical or verbal ) with assesment(s) is necessary to enable patient to complete evaluation       Based on the above components, the patient evaluation is determined to be of the following complexity level: LOW   Pain Ratin/10    Activity Tolerance:   Fair    After treatment patient left in no apparent distress:    Supine in bed, Bed / chair alarm activated and Caregiver / family present    COMMUNICATION/EDUCATION:   The patients plan of care was discussed with: Physical therapist and Registered nurse. Patient was educated regarding his deficit(s) of dizziness and impaired balance as this relates to his diagnosis of possible CVA. He demonstrated Fair understanding as evidenced by awareness of situation. Patient and/or family was verbally educated on the BE FAST acronym for signs/symptoms of CVA and TIA. Informed patient to refer to the Stroke Binder for further BE FAST information. All questions answered with patient indicating fair understanding.      Thank you for this referral.  Coni Burnett OT  Time Calculation: 18 mins

## 2021-08-10 NOTE — CONSULTS
NEUROLOGY CONSULT NOTE    Patient ID:  Tray Colón  [id number]  39 y.o.  1960    Date of Consultation:  August 10, 2021    Referring Physician: Dr. 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 No past surgical history on file. Prior to Admission medications    Medication Sig Start Date End Date Taking? Authorizing Provider   ibuprofen (MOTRIN) 200 mg tablet Take 600 mg by mouth daily as needed for Pain. Provider, Historical   aspirin 81 mg chewable tablet Take 1 Tablet by mouth daily for 30 days. 7/17/21 8/16/21  Smita Vargas MD   atorvastatin (LIPITOR) 80 mg tablet Take 1 Tablet by mouth nightly for 30 days. 7/16/21 8/15/21  Smita Vargas MD     No Known Allergies   Social History     Tobacco Use    Smoking status: Not on file   Substance Use Topics    Alcohol use: Not on file      No family history on file. Subjective:      Tray Colón is a 59 y.o. male with history of CVA (left thalamic infarct), hypertension and hyperlipidemia who was admitted from the ER for acute vertigo. Patient was walking at [facility] when he had a sudden onset of dizziness and room spinning sensation associated with nausea, hot flash and sweating. Denies any chest pains or palpitations. No loss of consciousness. No focal weakness or numbness, speech changes or visual disturbance. Right side of his neck just felt sore. EMS was called patient was brought to the ER. In the ER blood pressure was 199/108. NIH stroke scale was 0.   CBC revealed increased WBC at 14.3, CMP revealing decreased sodium at 131, increased potassium at 6.7, increased glucose, decreased calcium, increased AST. Urinalysis revealed possible UTI.  TSH, troponin, phosphorus and magnesium were unremarkable. Chest x-ray did not reveal any acute process. Head CT without contrast revealed no acute findings. Subtle chronic left thalamic lacunar infarct. Head and neck CTA did not reveal any flow-limiting stenosis or aneurysm. No ICA stenosis. Patient was recently admitted 7/15/2021 to 7/16/2021 for right sided numbness and found to have a small left thalamic infarct. Also noted to have issues with hypertension and hyperlipidemia. Patient was discharged on aspirin 81 mg daily and atorvastatin 80 mg daily. When seen patient reports just mild lightheadedness but no other symptoms. Outside reports reviewed: ER records, radiology reports, lab reports, historical medical records. Review of Systems:    Pertinent items are noted in HPI. Objective:     Patient Vitals for the past 8 hrs:   BP Pulse Resp SpO2   08/10/21 0834 (!) 156/95 62  95 %   08/10/21 0700 (!) 142/78 (!) 59  98 %   08/10/21 0606 (!) 157/89 61  97 %   08/10/21 0430 133/77 61  92 %   08/10/21 0400 (!) 145/90 (!) 59 12 92 %   08/10/21 0330 (!) 142/87 64  93 %   08/10/21 0300 (!) 163/96 63 13 95 %   08/10/21 0232 (!) 165/101 66 20 93 %   08/10/21 0205 (!) 153/95 66 13 96 %   08/10/21 0200 (!) 145/88 (!) 59 14 95 %   08/10/21 0145 (!) 148/85 62 15 94 %   08/10/21 0130 (!) 143/87 68 15 (!) 89 %   08/10/21 0115 (!) 154/96 66 10 92 %   08/10/21 0100 (!) 155/90 65 15 92 %     PHYSICAL EXAM:    NEUROLOGICAL EXAM:    Appearance: The patient is well developed, well nourished, provides a coherent history and is in no acute distress. Mental Status: Oriented to time, place and person. Fluent, no aphasia or dysarthria. Mood and affect appropriate. Cranial Nerves:   Intact visual fields. RHETT, EOM's full, right primary and end gaze nystagmus, no ptosis.  Facial sensation is normal. Corneal reflexes are intact. Facial movement is symmetric. Hearing is normal bilaterally. Palate is midline with normal elevation. Sternocleidomastoid and trapezius muscles are normal. Tongue is midline. Motor:  5/5 strength in upper and lower proximal and distal muscles. Normal bulk and tone. No pronator drift. Reflexes:   Deep tendon reflexes 1+/4 and symmetrical. Downgoing toes. Sensory:   Normal to cold and vibration. Gait:  Wide base and slightly unsteady (examined after MRI and after receiving ativan). No Romberg. Tremor:   No tremor noted. Cerebellar:  Intact FTN/ALEXEY/HTS. Imaging  CT Head x 2, head/neck CTA x 2, brain MRI: reviewed    Lab Review    Recent Results (from the past 24 hour(s))   CBC WITH AUTOMATED DIFF    Collection Time: 08/09/21  9:30 PM   Result Value Ref Range    WBC 14.3 (H) 4.1 - 11.1 K/uL    RBC 5.04 4.10 - 5.70 M/uL    HGB 14.0 12.1 - 17.0 g/dL    HCT 42.6 36.6 - 50.3 %    MCV 84.5 80.0 - 99.0 FL    MCH 27.8 26.0 - 34.0 PG    MCHC 32.9 30.0 - 36.5 g/dL    RDW 13.6 11.5 - 14.5 %    PLATELET 075 777 - 238 K/uL    MPV 10.4 8.9 - 12.9 FL    NRBC 0.0 0  WBC    ABSOLUTE NRBC 0.00 0.00 - 0.01 K/uL    NEUTROPHILS 85 (H) 32 - 75 %    LYMPHOCYTES 10 (L) 12 - 49 %    MONOCYTES 4 (L) 5 - 13 %    EOSINOPHILS 0 0 - 7 %    BASOPHILS 0 0 - 1 %    IMMATURE GRANULOCYTES 1 (H) 0.0 - 0.5 %    ABS. NEUTROPHILS 12.1 (H) 1.8 - 8.0 K/UL    ABS. LYMPHOCYTES 1.5 0.8 - 3.5 K/UL    ABS. MONOCYTES 0.5 0.0 - 1.0 K/UL    ABS. EOSINOPHILS 0.0 0.0 - 0.4 K/UL    ABS. BASOPHILS 0.1 0.0 - 0.1 K/UL    ABS. IMM.  GRANS. 0.1 (H) 0.00 - 0.04 K/UL    DF AUTOMATED     METABOLIC PANEL, COMPREHENSIVE    Collection Time: 08/09/21  9:30 PM   Result Value Ref Range    Sodium 131 (L) 136 - 145 mmol/L    Potassium 6.7 (HH) 3.5 - 5.1 mmol/L    Chloride 104 97 - 108 mmol/L    CO2 25 21 - 32 mmol/L    Anion gap 2 (L) 5 - 15 mmol/L    Glucose 133 (H) 65 - 100 mg/dL    BUN 11 6 - 20 MG/DL    Creatinine 0.66 (L) 0.70 - 1.30 MG/DL BUN/Creatinine ratio 17 12 - 20      GFR est AA >60 >60 ml/min/1.73m2    GFR est non-AA >60 >60 ml/min/1.73m2    Calcium 7.9 (L) 8.5 - 10.1 MG/DL    Bilirubin, total 1.0 0.2 - 1.0 MG/DL    ALT (SGPT) 33 12 - 78 U/L    AST (SGOT) 64 (H) 15 - 37 U/L    Alk. phosphatase 69 45 - 117 U/L    Protein, total 8.6 (H) 6.4 - 8.2 g/dL    Albumin 3.6 3.5 - 5.0 g/dL    Globulin 5.0 (H) 2.0 - 4.0 g/dL    A-G Ratio 0.7 (L) 1.1 - 2.2     SAMPLES BEING HELD    Collection Time: 08/09/21  9:30 PM   Result Value Ref Range    SAMPLES BEING HELD RED. GRN     COMMENT        Add-on orders for these samples will be processed based on acceptable specimen integrity and analyte stability, which may vary by analyte.    POTASSIUM    Collection Time: 08/09/21  9:30 PM   Result Value Ref Range    Potassium 6.9 (HH) 3.5 - 5.1 mmol/L   TROPONIN I    Collection Time: 08/09/21  9:30 PM   Result Value Ref Range    Troponin-I, Qt. <0.05 <0.05 ng/mL   PROTHROMBIN TIME + INR    Collection Time: 08/09/21 11:06 PM   Result Value Ref Range    INR 1.1 0.9 - 1.1      Prothrombin time 11.0 9.0 - 11.1 sec   TROPONIN I    Collection Time: 08/09/21 11:07 PM   Result Value Ref Range    Troponin-I, Qt. <0.05 <0.05 ng/mL   POTASSIUM    Collection Time: 08/09/21 11:07 PM   Result Value Ref Range    Potassium 3.8 3.5 - 5.1 mmol/L   GLUCOSE, POC    Collection Time: 08/09/21 11:09 PM   Result Value Ref Range    Glucose (POC) 131 (H) 65 - 117 mg/dL    Performed by [facility] W/MICROSCOPIC    Collection Time: 08/10/21  3:03 AM   Result Value Ref Range    Color YELLOW/STRAW      Appearance CLEAR CLEAR      Specific gravity 1.015 1.003 - 1.030      pH (UA) 7.0 5.0 - 8.0      Protein 30 (A) NEG mg/dL    Glucose Negative NEG mg/dL    Ketone Negative NEG mg/dL    Bilirubin Negative NEG      Blood Negative NEG      Urobilinogen 2.0 (H) 0.2 - 1.0 EU/dL    Nitrites Negative NEG      Leukocyte Esterase Negative NEG      WBC 5-10 0 - 4 /hpf    RBC 5-10 0 - 5 /hpf Epithelial cells MANY (A) FEW /lpf    Bacteria Negative NEG /hpf   URINE CULTURE HOLD SAMPLE    Collection Time: 08/10/21  3:03 AM    Specimen: Serum; Urine   Result Value Ref Range    Urine culture hold        Urine on hold in Microbiology dept for 2 days. If unpreserved urine is submitted, it cannot be used for addtional testing after 24 hours, recollection will be required.    CBC W/O DIFF    Collection Time: 08/10/21  5:35 AM   Result Value Ref Range    WBC 13.9 (H) 4.1 - 11.1 K/uL    RBC 4.84 4.10 - 5.70 M/uL    HGB 13.5 12.1 - 17.0 g/dL    HCT 40.8 36.6 - 50.3 %    MCV 84.3 80.0 - 99.0 FL    MCH 27.9 26.0 - 34.0 PG    MCHC 33.1 30.0 - 36.5 g/dL    RDW 13.3 11.5 - 14.5 %    PLATELET 191 161 - 134 K/uL    MPV 10.2 8.9 - 12.9 FL    NRBC 0.0 0  WBC    ABSOLUTE NRBC 0.00 0.00 - 0.01 K/uL   PTT    Collection Time: 08/10/21  5:35 AM   Result Value Ref Range    aPTT 27.4 22.1 - 31.0 sec    aPTT, therapeutic range     58.0 - 77.0 SECS   TSH 3RD GENERATION    Collection Time: 08/10/21  5:35 AM   Result Value Ref Range    TSH 0.45 0.36 - 0.32 uIU/mL   METABOLIC PANEL, BASIC    Collection Time: 08/10/21  5:35 AM   Result Value Ref Range    Sodium 136 136 - 145 mmol/L    Potassium 3.8 3.5 - 5.1 mmol/L    Chloride 103 97 - 108 mmol/L    CO2 28 21 - 32 mmol/L    Anion gap 5 5 - 15 mmol/L    Glucose 118 (H) 65 - 100 mg/dL    BUN 11 6 - 20 MG/DL    Creatinine 0.51 (L) 0.70 - 1.30 MG/DL    BUN/Creatinine ratio 22 (H) 12 - 20      GFR est AA >60 >60 ml/min/1.73m2    GFR est non-AA >60 >60 ml/min/1.73m2    Calcium 8.2 (L) 8.5 - 10.1 MG/DL   MAGNESIUM    Collection Time: 08/10/21  5:35 AM   Result Value Ref Range    Magnesium 2.2 1.6 - 2.4 mg/dL   PHOSPHORUS    Collection Time: 08/10/21  5:35 AM   Result Value Ref Range    Phosphorus 3.8 2.6 - 4.7 MG/DL         Assessment:   Vertigo  Hypertensive urgency  History of CVA    Plan:   Neurological examination reveals mainly primarily right and gaze nystagmus and no other focal findings. Need to consider peripheral etiology for the vertigo aggravated by severe hypertension. Consider hypertensive urgency. Head CT without contrast did not reveal any acute process. Old left thalamic infarct. Brain MRI without contrast done revealed no acute stroke. Head and neck CTA did not reveal any flow-limiting stenosis or aneurysm. No ICA stenosis. Recommend aggressive control of blood pressure as this is his primary stroke risk. Continue atorvastatin for hyperlipidemia. Continue aspirin 81 mg daily for stroke prophylaxis. Recommend cardiac evaluation given symptom was associated with hot flash and severe diaphoresis to rule out cardiac etiology. Patient may need an event monitor. No further recommendations from a neurological standpoint. Patient is supposed to see neurology after his last stroke. Thank for the consult. This note was created using voice recognition software. Despite editing, there may be syntax errors.

## 2021-08-10 NOTE — ED NOTES
Verbal shift change report given to Giovany Rodrigez RN (oncoming nurse) by Chet Centeno (offgoing nurse). Report included the following information SBAR, ED Summary, Intake/Output, MAR and Recent Results.

## 2021-08-10 NOTE — ED TRIAGE NOTES
Pt arrives via EMS from home for complaints of \"disorientation\" and lightheadedness. EMS reports BP of 200/100 and states that pt was ambulatory and had no neuro deficits. Pt did throw up in route. EMS reports 12 lead showing prolonged QT interval.     Hx of stroke 2 weeks ago.

## 2021-08-10 NOTE — PROGRESS NOTES
Pt was seen and evaluated. Case was discussed with Dr Kevin Moeller, the admitting physician. Pt is hemodynamically stable. C/o LT temporal and occipital HA. MRI of the brain and echo are unremarkable.

## 2021-08-10 NOTE — PROGRESS NOTES
Speech pathology note  Reviewed chart and note patient admitted with vertigo with concern for CVA. Note CT showed No acute findings and Subtle chronic left thalamic lacunar infarct and MRI showed No acute intracranial hemorrhage or infarct. Note patient passed the STAND and a regular diet was ordered. NIHSS=0. Discussed case with RN who reported no SLP-related concerns. Introduced self and role of SLP to patient. Patient denied any decline in motor speech, language, cognitive, or swallowing function. Formal SLP evaluation not clinically indicated at this time. Will sign off. Please re-consult if further needs arise. Thank you.     Vianey Huertas., CCC-SLP

## 2021-08-10 NOTE — PROGRESS NOTES
Admission Medication Reconciliation:     Information obtained from:    Patient via interview in ER 17  RxQuery data available¹:  YES    Comments/Recommendations:   Patient able to confirm name, , allergies, and preferred pharmacy  Updated PTA medication list  Medications confirmed with [address]. ¹RxQuery pharmacy benefit data reflects medications filled and processed through the patient's insurance, however   this data does NOT capture whether the medication was picked up or is currently being taken by the patient. Elyssa Isaac [id number]   Prior to Admission Medications   Prescriptions Last Dose Informant Taking?   aspirin delayed-release 325 mg tablet 2021 at Unknown time Self Yes   Sig: Take 325 mg by mouth daily. atorvastatin (LIPITOR) 80 mg tablet 2021 at Unknown time Self Yes   Sig: Take 1 Tablet by mouth nightly for 30 days. metoprolol succinate (Toprol XL) 100 mg tablet 2021 at Unknown time Self Yes   Sig: Take 100 mg by mouth daily. Facility-Administered Medications: None         Please contact the main inpatient pharmacy with any questions or concerns at [phone] and we will direct you to the clinical pharmacist covering this patient's care while in-house.    Michela Vazquez, DillonD, BCPS

## 2021-08-10 NOTE — DISCHARGE INSTRUCTIONS
ACUTE DIAGNOSES:  Acute severe vertigo [R42]  Cerebrovascular accident, old [Z86.73]  Leukocytosis [D72.829]    CHRONIC MEDICAL DIAGNOSES:  Problem List as of 8/11/2021 Date Reviewed: 8/10/2021        Codes Class Noted - Resolved    Leukocytosis ICD-10-CM: D72.829  ICD-9-CM: 288.60  8/10/2021 - Present        Cerebrovascular accident, old ICD-10-CM: Z86.73  ICD-9-CM: V12.54  8/10/2021 - Present        Acute severe vertigo ICD-10-CM: R42  ICD-9-CM: 780.4  8/10/2021 - Present        Cerebellar stroke syndrome ICD-10-CM: G46.4  ICD-9-CM: 075  8/10/2021 - Present        Elevated blood pressure reading ICD-10-CM: R03.0  ICD-9-CM: 796.2  7/16/2021 - Present        Acute CVA (cerebrovascular accident) [facility]) ICD-10-CM: I63.9  ICD-9-CM: 434.91  7/15/2021 - Present              DISCHARGE MEDICATIONS:          · It is important that you take the medication exactly as they are prescribed. · Keep your medication in the bottles provided by the pharmacist and keep a list of the medication names, dosages, and times to be taken in your wallet. · Do not take other medications without consulting your doctor. DIET:  Cardiac Diet    ACTIVITY: Activity as tolerated    ADDITIONAL INFORMATION: If you experience any of the following symptoms then please call your primary care physician or return to the emergency room if you cannot get hold of your doctor: Fever, chills, nausea, vomiting, diarrhea, change in mentation, falling, bleeding, shortness of breath. FOLLOW UP CARE:  Dr. Juan Rubio MD  you are to call and set up an appointment to see them in 5 days. Information obtained by :  I understand that if any problems occur once I am at home I am to contact my physician. I understand and acknowledge receipt of the instructions indicated above.                                                                                                                                            Physician's or R.N.'s Signature                                                                  Date/Time                                                                                                                                              Patient or Representative Signature                                                          Date/Time

## 2021-08-10 NOTE — ED PROVIDER NOTES
Itzel Nelson is a 59 y.o. male with past medical history notable for  presenting with sudden onset of dizziness while walking at [address] [place], describes it as primarily whirling vertigo, has some lightheadedness component. Did not have a syncopal episode. Denies headache. Has noticed some right-sided neck soreness since onset of symptoms. Denies pain with head movement. No fevers. He states that when he became dizzy also had onset of nausea and diaphoresis. No chest pain or palpitations. He denies difficulty walking, speaking, visual disturbance. He has been monitoring his blood pressure, was seen 2 weeks ago for strokelike symptoms, had a positive MRI for an acute left thalamic infarct, started on aspirin and also started metoprolol 100 mg XR daily in the past 2 weeks, took his dose today. States his blood pressure was on average 130s over 80s this week. Blood pressure at home after this episode was in the 945 systolic. No past medical history on file. No past surgical history on file. No family history on file. Social History     Socioeconomic History    Marital status:      Spouse name: Not on file    Number of children: Not on file    Years of education: Not on file    Highest education level: Not on file   Occupational History    Not on file   Tobacco Use    Smoking status: Not on file   Substance and Sexual Activity    Alcohol use: Not on file    Drug use: Not on file    Sexual activity: Not on file   Other Topics Concern    Not on file   Social History Narrative    Not on file     Social Determinants of Health     Financial Resource Strain:     Difficulty of Paying Living Expenses:    Food Insecurity:     Worried About Running Out of Food in the Last Year:     [address] in the Last Year:    Transportation Needs:     Lack of Transportation (Medical):      Lack of Transportation (Non-Medical):    Physical Activity:     Days of Exercise per Week:     Minutes of Exercise per Session:    Stress:     Feeling of Stress :    Social Connections:     Frequency of Communication with Friends and Family:     Frequency of Social Gatherings with Friends and Family:     Attends Spiritism Services:     Active Member of Clubs or Organizations:     Attends Club or Organization Meetings:     Marital Status:    Intimate Partner Violence:     Fear of Current or Ex-Partner:     Emotionally Abused:     Physically Abused:     Sexually Abused: ALLERGIES: Patient has no known allergies. Review of Systems   Constitutional: Negative for chills, fatigue and fever. HENT: Negative for ear pain, sore throat and trouble swallowing. Eyes: Negative for visual disturbance. Respiratory: Negative for cough and shortness of breath. Cardiovascular: Negative for chest pain. Gastrointestinal: Negative for abdominal pain. Genitourinary: Negative for dysuria. Musculoskeletal: Negative for back pain. Skin: Negative for rash. Neurological: Positive for dizziness. Negative for seizures, light-headedness and headaches. Psychiatric/Behavioral: Negative for confusion. All other systems reviewed and are negative. Vitals:    08/10/21 0045 08/10/21 0100 08/10/21 0115 08/10/21 0130   BP: (!) 149/92 (!) 155/90 (!) 154/96 (!) 143/87   Pulse: 65 65 66 68   Resp: 13 15 10 15   SpO2: (!) 88% 92% 92% (!) 89%            Physical Exam  Vitals and nursing note reviewed. Constitutional:       General: He is not in acute distress. Appearance: He is well-developed. He is not toxic-appearing. HENT:      Head: Normocephalic and atraumatic. Right Ear: External ear normal.      Left Ear: External ear normal.      Mouth/Throat:      Mouth: Mucous membranes are moist.   Eyes:      Extraocular Movements: Extraocular movements intact. Pupils: Pupils are equal, round, and reactive to light. Cardiovascular:      Rate and Rhythm: Normal rate and regular rhythm.       Heart sounds: Normal heart sounds. Pulmonary:      Effort: Pulmonary effort is normal. No respiratory distress. Breath sounds: Normal breath sounds. Chest:      Chest wall: No tenderness. Abdominal:      General: There is no distension. Palpations: Abdomen is soft. There is no mass. Tenderness: There is no abdominal tenderness. There is no guarding. Musculoskeletal:      Cervical back: Normal range of motion. Right lower leg: No edema. Left lower leg: No edema. Skin:     Capillary Refill: Capillary refill takes less than 2 seconds. Neurological:      General: No focal deficit present. Mental Status: He is alert and oriented to person, place, and time. Cranial Nerves: No dysarthria. Sensory: Sensation is intact. No sensory deficit. Motor: Motor function is intact. No weakness. Coordination: Coordination is intact. Deep Tendon Reflexes: Right Babinski's sign: r eye questioninable ptosis. Comments: r eye questioninable ptosis. eomi  Rightward beating nystagmus   Lower face wnl. Psychiatric:         Mood and Affect: Mood normal.          MDM  Number of Diagnoses or Management Options    MEDICAL DECISION MAKIN y.o. male presents with Dizziness and Hypertension    Differential diagnosis includes but not limited to: Recurrent infarct, recrudescence of previous symptoms, hypertensive urgency, vertebral or basilar thrombosis or dissection    LABORATORY TESTS:  Labs Reviewed   CBC WITH AUTOMATED DIFF - Abnormal; Notable for the following components:       Result Value    WBC 14.3 (*)     NEUTROPHILS 85 (*)     LYMPHOCYTES 10 (*)     MONOCYTES 4 (*)     IMMATURE GRANULOCYTES 1 (*)     ABS. NEUTROPHILS 12.1 (*)     ABS. IMM.  GRANS. 0.1 (*)     All other components within normal limits   METABOLIC PANEL, COMPREHENSIVE - Abnormal; Notable for the following components:    Sodium 131 (*)     Potassium 6.7 (*)     Anion gap 2 (*)     Glucose 133 (*) Creatinine 0.66 (*)     Calcium 7.9 (*)     AST (SGOT) 64 (*)     Protein, total 8.6 (*)     Globulin 5.0 (*)     A-G Ratio 0.7 (*)     All other components within normal limits   POTASSIUM - Abnormal; Notable for the following components:    Potassium 6.9 (*)     All other components within normal limits   GLUCOSE, POC - Abnormal; Notable for the following components:    Glucose (POC) 131 (*)     All other components within normal limits   URINE CULTURE HOLD SAMPLE   SAMPLES BEING HELD   PROTHROMBIN TIME + INR   TROPONIN I   TROPONIN I   POTASSIUM   URINALYSIS W/MICROSCOPIC       IMAGING RESULTS:  XR CHEST PORT   Final Result    impression: No acute infiltrate. CTA HEAD NECK W CONT         CT HEAD WO CONT   Final Result   No acute findings. Subtle chronic left thalamic lacunar infarct. MEDICATIONS GIVEN:  Medications   ondansetron (ZOFRAN) injection 4 mg (4 mg IntraVENous Given 8/9/21 8002)   iopamidoL (ISOVUE-370) 76 % injection 100 mL (100 mL IntraVENous Given 8/9/21 5049)       PROGRESS NOTE:   2:18 AM Patient's symptoms have not improved he remains significantly vertiginous and is unable to ambulate    EKG:  Reviewed     CONSULTS:  [facility] for Admission  2:18 AM    ED Room Number: ER09/09  Patient Name and age:  Nate Landry 59 y.o.  male  Working Diagnosis:   1. Acute severe vertigo    2. Cerebellar stroke syndrome        COVID-19 Suspicion:  no  Sepsis present:  no  Reassessment needed: no  Code Status:  Full Code  Readmission: no  Isolation Requirements:  no  Recommended Level of Care:  telemetry  Department:[facility] Adult ED - 21   Other: Patient with recent stroke on admission presenting with vertigo, CTA negative, and slight right ptosis, would likely benefit from another MRI and neuro consultation. Was not a TPA candidate. Was severely hypertensive initially, hypertension improved but symptoms were unchanged. IMPRESSION:  1. Acute severe vertigo    2.  Cerebellar stroke syndrome        PLAN:  - Admit to hospitalist    Total critical care time spent exclusive of procedures:  44 minutes    Dm Morales MD          Please note that this dictation was completed with [employer], the computer voice recognition software. Quite often unanticipated grammatical, syntax, homophones, and other interpretive errors are inadvertently transcribed by the computer software. Please disregard these errors. Please excuse any errors that have escaped final proofreading.            Procedures

## 2021-08-11 VITALS
BODY MASS INDEX: 36.64 KG/M2 | OXYGEN SATURATION: 93 % | DIASTOLIC BLOOD PRESSURE: 86 MMHG | HEART RATE: 59 BPM | TEMPERATURE: 98 F | SYSTOLIC BLOOD PRESSURE: 137 MMHG | WEIGHT: 285.4 LBS | RESPIRATION RATE: 16 BRPM

## 2021-08-11 LAB
ATRIAL RATE: 74 BPM
CALCULATED P AXIS, ECG09: 36 DEGREES
CALCULATED R AXIS, ECG10: 0 DEGREES
CALCULATED T AXIS, ECG11: 55 DEGREES
DIAGNOSIS, 93000: NORMAL
ERYTHROCYTE [DISTWIDTH] IN BLOOD BY AUTOMATED COUNT: 13.3 % (ref 11.5–14.5)
HCT VFR BLD AUTO: 41 % (ref 36.6–50.3)
HGB BLD-MCNC: 13.2 G/DL (ref 12.1–17)
MCH RBC QN AUTO: 27.5 PG (ref 26–34)
MCHC RBC AUTO-ENTMCNC: 32.2 G/DL (ref 30–36.5)
MCV RBC AUTO: 85.4 FL (ref 80–99)
NRBC # BLD: 0 K/UL (ref 0–0.01)
NRBC BLD-RTO: 0 PER 100 WBC
P-R INTERVAL, ECG05: 174 MS
PLATELET # BLD AUTO: 311 K/UL (ref 150–400)
PMV BLD AUTO: 10.3 FL (ref 8.9–12.9)
Q-T INTERVAL, ECG07: 444 MS
QRS DURATION, ECG06: 94 MS
QTC CALCULATION (BEZET), ECG08: 492 MS
RBC # BLD AUTO: 4.8 M/UL (ref 4.1–5.7)
VENTRICULAR RATE, ECG03: 74 BPM
WBC # BLD AUTO: 11 K/UL (ref 4.1–11.1)

## 2021-08-11 PROCEDURE — 36415 COLL VENOUS BLD VENIPUNCTURE: CPT

## 2021-08-11 PROCEDURE — 74011250636 HC RX REV CODE- 250/636: Performed by: HOSPITALIST

## 2021-08-11 PROCEDURE — 85027 COMPLETE CBC AUTOMATED: CPT

## 2021-08-11 PROCEDURE — 99218 HC RM OBSERVATION: CPT

## 2021-08-11 PROCEDURE — 74011250637 HC RX REV CODE- 250/637: Performed by: INTERNAL MEDICINE

## 2021-08-11 PROCEDURE — 94760 N-INVAS EAR/PLS OXIMETRY 1: CPT

## 2021-08-11 RX ORDER — MECLIZINE HYDROCHLORIDE 25 MG/1
25 TABLET ORAL
Qty: 30 TABLET | Refills: 0 | Status: SHIPPED | OUTPATIENT
Start: 2021-08-11 | End: 2021-08-21

## 2021-08-11 RX ORDER — AMLODIPINE BESYLATE 5 MG/1
5 TABLET ORAL DAILY
Status: DISCONTINUED | OUTPATIENT
Start: 2021-08-11 | End: 2021-08-11 | Stop reason: HOSPADM

## 2021-08-11 RX ORDER — AMLODIPINE BESYLATE 5 MG/1
5 TABLET ORAL DAILY
Qty: 30 TABLET | Refills: 0 | Status: SHIPPED | OUTPATIENT
Start: 2021-08-12 | End: 2021-09-13 | Stop reason: SDUPTHER

## 2021-08-11 RX ADMIN — ASPIRIN 325 MG: 325 TABLET, COATED ORAL at 08:06

## 2021-08-11 RX ADMIN — AMLODIPINE BESYLATE 5 MG: 5 TABLET ORAL at 08:06

## 2021-08-11 RX ADMIN — METOPROLOL SUCCINATE 100 MG: 50 TABLET, EXTENDED RELEASE ORAL at 08:06

## 2021-08-11 RX ADMIN — MECLIZINE HYDROCHLORIDE 25 MG: 25 TABLET ORAL at 08:06

## 2021-08-11 NOTE — ACP (ADVANCE CARE PLANNING)
Advance Care Planning   Advance Care Planning Inpatient Note  [address] [facility]    Today's Date: 8/11/2021  Unit: [facility] 3 PROG CARE TELE 2    Received request from In Basket. Upon review of chart and communication with care team, patient's decision making abilities are not in question. Patient was/were present in the room during visit. Goals of ACP Conversation:  Discuss Advance Care planning documents  Facilitate a discussion related to patient's goals of care as they align with the patient's values and beliefs    Health Care Decision Makers:      Click here to complete [address] including selection of the Healthcare Decision Maker Relationship (ie \"Primary\")       Advance Care Planning Documents (Patient Wishes) on file:  None     Assessment:    In Basket request to discuss Advance Medical Directive (AMD). Discussing took place in patients room, 331. Patient, Mr. Neva Escalante, welcomed the visit and shared some of how he came to be in hospital. He was unfamiliar of what an AMD was and vaguely remembers being asked about it. He expressed it would not hurt to go over it, so  gave him the AMD form and \"Your Right to Decide\" booklet and went through the entire form. He expressed that he would talk it over more with his wife but deferred for now. He also understood how [facility] [place] [address] works. The AMD form and booklet were left with Mr. Bess Main. He expressed his gratitude and  informed him how to reach [facility]. His Nurse came in and  advised nurse to contact [facility] for any further referrals.        Interventions:  Provided education on documents for clarity and greater understanding  Discussed and provided education on state decision maker hierarchy  Encouraged ongoing ACP conversation with future decision makers and loved ones  Reviewed but did not complete ACP document     Outcomes/Plan:  Teach Back Method used to verify the patient/Healthcare Decision Maker's understanding of key information in the advance directive documents     Electronically signed by Chaplain Ryan on 8/11/2021 at 12:18 PM

## 2021-08-11 NOTE — PROGRESS NOTES
8/11/2021  11:35 AM  Transition of Care Plan:             RUR N/A pt is OBS  LOS 2 Days  1. Pt medically stable for DC   2. Home w/ family assistance  3. PCP follow up 1-2 weeks, pt will schedule his own appt  4. CM provided [facility] information, added to AVS  5. Wife will transport   Pt is ready for DC from CM standpoint  Care Management Interventions  PCP Verified by CM:  Yes (Dr. Long Hartmann)  Mode of Transport at Discharge: Self  Current Support Network: Lives with Spouse  Confirm Follow Up Transport: Family  The Plan for Transition of Care is Related to the Following Treatment Goals : severe vertigo  Discharge Location  Discharge Placement: Home with family assistance    TUNDE Shane

## 2021-08-11 NOTE — ROUTINE PROCESS
Discharge medications reviewed with patient and spouse and appropriate educational materials and side effects teaching were provided. I have reviewed discharge instructions with the patient and spouse. The patient and spouse verbalized understanding. AVS signed and given to patient and spouse. PIV removed.

## 2021-08-11 NOTE — PROGRESS NOTES
Stroke Education provided to patient and the following topics were discussed    1. Patients personal risk factors for stroke are hypertension and prior stroke    2. Warning signs of Stroke:        * Sudden numbness or weakness of the face, arm or leg, especially on one side of          The body            * Sudden confusion, trouble speaking or understanding        * Sudden trouble seeing in one or both eyes        * Sudden trouble walking, dizziness, loss of balance or coordination        * Sudden severe headache with no known cause      3. Importance of activation Emergency Medical Services ( 9-1-1 ) immediately if experience any warning signs of stroke. 4. Be sure and schedule a follow-up appointment with your primary care doctor or any specialists as instructed. 5. You must take medicine every day to treat your risk factors for stroke. Be sure to take your medicines exactly as your doctor tells you: no more, no less. Know what your medicines are for , what they do. Anti-thrombotics /anticoagulants can help prevent strokes. You are taking the following medicine(s) ASA and Lipitor    6. Smoking and second-hand smoke greatly increase your risk of stroke, cardiovascular disease and death. Smoking history never    7. Information provided was [facility] Stroke Education Binder, Stroke Handouts or Verbal Education    8. Documentation of teaching completed in Patient Education Activity and on Care Plan with teaching response noted?   yes

## 2021-08-11 NOTE — PROGRESS NOTES
[facility]  [address] [facility], [place], [address] [place] [address]  [phone]      Medical Progress Note      NAME: Tray Colón   :  1960  MRM:  [id number]    Date of service: 2021  7:16 AM       Assessment and Plan:   1. Acute severe vertigo. Hx of CVA:  MRI of the brain is unremarkable. continue aspirin, statin, antiemetics, meclizine, and supportive care. Evaluated by neurology, PT/OT. Needs outpatient PT.      2.  HTN. BP not well controlled. Add amlodipine.      3.  Leukocytosis: resolved. No evidence of infection            Subjective:     Chief Complaint[de-identified] Patient was seen and examined as a follow up for vertigo. Chart was reviewed. feels better   ROS:  (bold if positive, if negative)    Tolerating PT  Tolerating Diet        Objective:     Last 24hrs VS reviewed since prior progress note.  Most recent are:    Visit Vitals  BP (!) 147/49 (BP 1 Location: Left upper arm)   Pulse 62   Temp 97.8 °F (36.6 °C)   Resp 16   Wt 129.5 kg (285 lb 6.4 oz)   SpO2 94%   BMI 36.64 kg/m²     SpO2 Readings from Last 6 Encounters:   21 94%   21 95%        No intake or output data in the 24 hours ending 21 0716     Physical Exam:    Gen:  Well-developed, well-nourished, in no acute distress  HEENT:  Pink conjunctivae, PERRL, hearing intact to voice, moist mucous membranes  Neck:  Supple, without masses, thyroid non-tender  Resp:  No accessory muscle use, clear breath sounds without wheezes rales or rhonchi  Card:  No murmurs, normal S1, S2 without thrills, bruits or peripheral edema  Abd:  Soft, non-tender, non-distended, normoactive bowel sounds are present, no palpable organomegaly and no detectable hernias  Lymph:  No cervical or inguinal adenopathy  Musc:  No cyanosis or clubbing  Skin:  No rashes or ulcers, skin turgor is good  Neuro:  Cranial nerves are grossly intact, no focal motor weakness, follows commands appropriately  Psych:  Good insight, oriented to person, place and time, alert  __________________________________________________________________  Medications Reviewed: (see below)  Medications:     Current Facility-Administered Medications   Medication Dose Route Frequency    ondansetron (ZOFRAN) injection 4 mg  4 mg IntraVENous Q6H PRN    labetaloL (NORMODYNE;TRANDATE) 20 mg/4 mL (5 mg/mL) injection 5 mg  5 mg IntraVENous Q10MIN PRN    bisacodyL (DULCOLAX) suppository 10 mg  10 mg Rectal DAILY PRN    acetaminophen (TYLENOL) tablet 650 mg  650 mg Oral Q4H PRN    Or    acetaminophen (TYLENOL) solution 650 mg  650 mg Per NG tube Q4H PRN    Or    acetaminophen (TYLENOL) suppository 650 mg  650 mg Rectal Q4H PRN    meclizine (ANTIVERT) tablet 25 mg  25 mg Oral TID    aspirin delayed-release tablet 325 mg  325 mg Oral DAILY    atorvastatin (LIPITOR) tablet 80 mg  80 mg Oral QHS    metoprolol succinate (TOPROL-XL) XL tablet 100 mg  100 mg Oral DAILY    butalbital-acetaminophen-caffeine (FIORICET, ESGIC) -40 mg per tablet 2 Tablet  2 Tablet Oral Q6H PRN        Lab Data Reviewed: (see below)  Lab Review:     Recent Labs     08/11/21  0305 08/10/21  0535 08/09/21  2130   WBC 11.0 13.9* 14.3*   HGB 13.2 13.5 14.0   HCT 41.0 40.8 42.6    355 338     Recent Labs     08/10/21  0535 08/09/21  2307 08/09/21  2306 08/09/21  2130     --   --  131*   K 3.8 3.8  --  6.9*  6.7*     --   --  104   CO2 28  --   --  25   *  --   --  133*   BUN 11  --   --  11   CREA 0.51*  --   --  0.66*   CA 8.2*  --   --  7.9*   MG 2.2  --   --   --    PHOS 3.8  --   --   --    ALB  --   --   --  3.6   TBILI  --   --   --  1.0   ALT  --   --   --  33   INR  --   --  1.1  --      Lab Results   Component Value Date/Time    Glucose (POC) 131 (H) 08/09/2021 11:09 PM     No results for input(s): PH, PCO2, PO2, HCO3, FIO2 in the last 72 hours.   Recent Labs     08/09/21  2306   INR 1.1     All Micro Results     Procedure Component Value Units Date/Time    URINE CULTURE HOLD SAMPLE [[id number]] Collected: 08/10/21 0303    Order Status: Completed Specimen: Urine from Serum Updated: 08/10/21 0329     Urine culture hold       Urine on hold in Microbiology dept for 2 days. If unpreserved urine is submitted, it cannot be used for addtional testing after 24 hours, recollection will be required. I have reviewed notes of prior 24hr. Other pertinent lab: Total time spent with patient: 28 I personally reviewed chart, notes, data and current medications in the medical record. I have personally examined and treated the patient at bedside during this period.                  Care Plan discussed with: Patient, Nursing Staff and >50% of time spent in counseling and coordination of care    Discussed:  Care Plan    Prophylaxis:  Lovenox    Disposition:  Home w/Family           ___________________________________________________    Attending Physician: Cosme Villanueva MD

## 2021-08-11 NOTE — PROGRESS NOTES
[address] August 11, 2021       RE: Angie Boateng      To Whom It May Concern,    This is to certify that Angie Boateng was hospitalized at [facility] from 8/9/2021 to 8/11/2021 and may may return to work on 08/16/21. Please feel free to contact my office if you have any questions or concerns. Thank you for your assistance in this matter.       Sincerely,  Kim Bullard MD  [phone]

## 2021-08-11 NOTE — PROGRESS NOTES
[address] August 11, 2021       RE: Lilly Purvis      To Whom It May Concern,    This is to certify that Lilly Purvis was hospitalized at [address] [place] [address] from 8/9/2021 to 8/11/2021 and may may return to work on 08/16/21. Please feel free to contact my office if you have any questions or concerns. Thank you for your assistance in this matter.       Sincerely,  Aron Bamberger, MD  [phone]

## 2021-08-11 NOTE — PROGRESS NOTES
Spiritual Care Assessment/Progress Note  [address]      NAME: Alon Christian      MRN: [id number]  AGE: 59 y.o.  SEX: male  Tenriism Affiliation: No preference   Language: English     8/11/2021     Total Time (in minutes): 48     Spiritual Assessment begun in [facility] 3 PRO CARE TELE 2 through conversation with:         [x]Patient        [] Family    [] Friend(s)        Reason for Consult: Advance medical directive consult     Spiritual beliefs: (Please include comment if needed)     [x] Identifies with a jessica tradition:    Amish      [x] Supported by a jessica community:            [] Claims no spiritual orientation:           [] Seeking spiritual identity:                [] Adheres to an individual form of spirituality:           [] Not able to assess:                           Identified resources for coping:      [x] Prayer                               [] Music                  [] Guided Imagery     [x] Family/friends                 [] Pet visits     [] Devotional reading                         [] Unknown     [] Other:                                               Interventions offered during this visit: (See comments for more details)    Patient Interventions: Advance medical directive consult, Affirmation of emotions/emotional suffering, Affirmation of jessica, Catharsis/review of pertinent events in supportive environment, Initial/Spiritual assessment, patient floor, Normalization of emotional/spiritual concerns, Prayer (assurance of), Life review/legacy, Tenriism beliefs/image of God discussed           Plan of Care:     [] Support spiritual and/or cultural needs    [] Support AMD and/or advance care planning process      [] Support grieving process   [] Coordinate Rites and/or Rituals    [] Coordination with community clergy   [] No spiritual needs identified at this time   [] Detailed Plan of Care below (See Comments)  [] Make referral to Music Therapy  [] Make referral to Pet Therapy     [] Make referral to Addiction services  [] Make referral to [facility]  [] Make referral to Spiritual Care Partner  [] No future visits requested        [x] Follow up upon further referrals     Comments:   In Basket request to discuss Advance Medical Directive (AMD). Discussing took place in patients room, 331. Patient, Mr. Mukesh Ritchie, welcomed the visit and shared some of how he came to be in hospital. He was unfamiliar of what an AMD was and vaguely remembers being asked about it. He expressed it would not hurt to go over it, so  gave him the AMD form and \"Your Right to Decide\" booklet and went through the entire form. He expressed that he would talk it over more with his wife but deferred for now. He also understood how [facility] [place] [address] works. The AMD form and booklet were left with Mr. Cleopatra Romberg. He expressed his gratitude and  informed him how to reach [facility]. His Nurse came in and  advised nurse to contact [facility] for any further referrals. Additionally, Mr. Cleopatra Romberg shared that he has his wife and two adult children that are supportive. As well as a  who has come visited him already 2 or 3 times. He has a supportive Anabaptist.      Chaplain Marc Lynch M.Div.  [facility] ([id number])

## 2021-08-19 ENCOUNTER — OFFICE VISIT (OUTPATIENT)
Dept: NEUROLOGY | Age: 61
End: 2021-08-19
Payer: COMMERCIAL

## 2021-08-19 VITALS
DIASTOLIC BLOOD PRESSURE: 78 MMHG | OXYGEN SATURATION: 95 % | SYSTOLIC BLOOD PRESSURE: 130 MMHG | WEIGHT: 279 LBS | HEIGHT: 74 IN | HEART RATE: 61 BPM | BODY MASS INDEX: 35.81 KG/M2

## 2021-08-19 DIAGNOSIS — R42 VERTIGO: ICD-10-CM

## 2021-08-19 DIAGNOSIS — R26.81 GAIT INSTABILITY: ICD-10-CM

## 2021-08-19 DIAGNOSIS — E78.2 MIXED HYPERLIPIDEMIA: ICD-10-CM

## 2021-08-19 DIAGNOSIS — I63.9 ACUTE CVA (CEREBROVASCULAR ACCIDENT) (HCC): Primary | ICD-10-CM

## 2021-08-19 DIAGNOSIS — I10 ESSENTIAL HYPERTENSION: ICD-10-CM

## 2021-08-19 DIAGNOSIS — G44.86 CERVICOGENIC HEADACHE: ICD-10-CM

## 2021-08-19 PROCEDURE — 99215 OFFICE O/P EST HI 40 MIN: CPT | Performed by: PSYCHIATRY & NEUROLOGY

## 2021-08-19 RX ORDER — ATORVASTATIN CALCIUM 80 MG/1
80 TABLET, FILM COATED ORAL DAILY
COMMUNITY

## 2021-08-19 NOTE — PATIENT INSTRUCTIONS
Learning About How to Prevent a Stroke  What is a stroke? A stroke occurs when a blood vessel in the brain bursts or is blocked by a blood clot. Without blood and the oxygen it carries, part of the brain starts to die. The part of the body controlled by the damaged area of the brain can't work properly. But there are many things you can do to help lower your stroke risk. What increases your risk for stroke? A risk factor is anything that makes you more likely to have a particular health problem. Risk factors for stroke that you can treat or change include:  · Health problems like high blood pressure, atrial fibrillation, diabetes, and high cholesterol. · Smoking. · Heavy use of alcohol. · Being overweight. · Not getting enough physical activity. Risk factors you can't change include:  · Age. The risk of stroke goes up as you get older. · Race.  Americans, Native Americans, and [place] Natives have a higher risk than those of other races. · Being female. Women have a higher risk of stroke than men. · Family history of stroke. Your doctor can help you know your risk. Then you and your can doctor talk about whether you need to lower it. What can you do to prevent a stroke? · Treat any health problems you have that raise your risk. · Adopt a heart-healthy lifestyle:  ? Don't smoke. If you need help quitting, talk to your doctor about stop-smoking programs and medicines. These can increase your chances of quitting for good. ? Limit alcohol to 2 drinks a day for men and 1 drink a day for women. ? Stay at a healthy weight. Lose weight if you need to.  ? If your doctor recommends it, get more exercise. Walking is a good choice. Bit by bit, increase the amount you walk every day. Try for at least 30 minutes on most days of the week. ? Eat heart-healthy foods. These include fruits, vegetables, high-fiber foods, and fish.  Choose foods that are low in sodium, saturated fat, and trans fat.  · Decide with your doctor whether you will also take medicines to help lower your risk. For example, you and your doctor may decide you will take a medicine that prevents blood clots. What are the symptoms of a stroke? Symptoms of a stroke happen quickly. A stroke may cause:  · Sudden numbness, tingling, weakness, or loss of movement in your face, arm, or leg, especially on only one side of your body. · Sudden vision changes. · Sudden trouble speaking. · Sudden confusion or trouble understanding simple statements. · Sudden problems with walking or balance. · A sudden, severe headache that is different from past headaches. FAST is a simple way to remember the main symptoms of stroke. Recognizing these symptoms helps you know when to call for medical help. FAST stands for:  · F ace drooping. · A rm weakness. · S peech difficulty. · T faviola to call 911. It's important to call for medical help if you have stroke symptoms. Quick treatment may save your life. And it may reduce the damage in your brain so that you have fewer problems after the stroke. Follow-up care is a key part of your treatment and safety. Be sure to make and go to all appointments, and call your doctor if you are having problems. It's also a good idea to know your test results and keep a list of the medicines you take. Where can you learn more? Go to http://www.gray.com/  Enter G757 in the search box to learn more about \"Learning About How to Prevent a Stroke. \"  Current as of: March 4, 2020               Content Version: 12.8  © 2006-2021 Healthwise, Incorporated. Care instructions adapted under license by [employer] (which disclaims liability or warranty for this information). If you have questions about a medical condition or this instruction, always ask your healthcare professional. [place] [address] any warranty or liability for your use of this information.          Learning About How to Prevent Another Stroke  What can you do to prevent another stroke? After a stroke, people feel lots of different emotions. Some people are worried that they could have another stroke. Or they may feel overwhelmed by how much there is to learn and do. Some people feel sad or depressed. No matter what emotions you are feeling, you can give yourself some control and peace of mind by making a plan to lower your risk of having another stroke. Take your medicines  You'll need to take medicines to help prevent another stroke. Be sure to take your medicines exactly as prescribed. And don't stop taking them unless your doctor tells you to. If you stop taking your medicines, you can increase your risk of having another stroke. Some of the medicines your doctor may prescribe include:  · Aspirin or some other blood thinner to prevent blood clots. · Statins and other medicines to lower cholesterol. · Blood pressure medicines to lower blood pressure. Manage other health problems  You can help lower your chance of having another stroke by managing certain other health problems. Problems that increase your risk of having another stroke include:  · High blood pressure. · High cholesterol. · Atrial fibrillation. · Diabetes. If you have any of these health problems, you can manage them with healthy lifestyle changes along with medicine. Adopt a healthy lifestyle  · Do not smoke or allow others to smoke around you. If you need help quitting, talk to your doctor about stop-smoking programs and medicines. These can increase your chances of quitting for good. Smoking makes a stroke more likely. · Limit alcohol to 2 drinks a day for men and 1 drink a day for women. · Lose weight if you need to. Controlling your weight will help you keep your heart and body healthy. · Be active. Ask your doctor what type and level of activity is safe for you.   · Eat heart-healthy foods, like fruits, vegetables, and high-fiber foods.  It's also important to:  · Get a flu shot every year. · Ask for help if you think you are depressed. Do stroke rehab  Taking part in a stroke rehabilitation (rehab) program will help you to regain skills you lost or make the most of your abilities after a stroke. It also helps you take steps to prevent another stroke. Your rehab team will give you education and support to help you build new, healthy habits. You'll learn how to manage any other health problems that you might have. Suzan Gloria also learn how to exercise safely, eat a healthy diet, and quit smoking if you smoke. Suzan Gloria work with your team to decide what lifestyle choices are best for you. If your doctor hasn't already suggested it, ask him or her if stroke rehab is right for you. Know stroke symptoms  Make sure you know the symptoms of stroke. FAST is a simple way to remember. Recognizing these symptoms helps you know when to call for medical help. FAST stands for:  · F ace drooping. · A rm weakness. · S peech difficulty. · T faviola to call 911. Follow-up care is a key part of your treatment and safety. Be sure to make and go to all appointments, and call your doctor if you are having problems. It's also a good idea to know your test results and keep a list of the medicines you take. Where can you learn more? Go to http://www.gray.com/  Enter V535 in the search box to learn more about \"Learning About How to Prevent Another Stroke. \"  Current as of: March 4, 2020               Content Version: 12.8  © 2006-2021 Healthwise, Incorporated. Care instructions adapted under license by [employer] (which disclaims liability or warranty for this information). If you have questions about a medical condition or this instruction, always ask your healthcare professional. [address] any warranty or liability for your use of this information.

## 2021-08-19 NOTE — PROGRESS NOTES
NEUROLOGY CLINIC NOTE    Patient ID:  Dior Rosenberg  [id number]  74 y.o.  1960    Date of Consultation:  August 19, 2021    Reason for Consultation:  Stroke    Chief Complaint   Patient presents with    New Patient    Stroke       History of Present Illness:     Patient Active Problem List    Diagnosis Date Noted    Leukocytosis 08/10/2021    Cerebrovascular accident, old 08/10/2021    Acute severe vertigo 08/10/2021    Cerebellar stroke syndrome 08/10/2021    Elevated blood pressure reading 07/16/2021    Acute CVA (cerebrovascular accident) ([facility] [address] 07/15/2021     Past Medical History:   Diagnosis Date    CVA (cerebral vascular accident) ([facility] [address]     HTN (hypertension)     Hyperlipidemia     Vertigo        No past surgical history on file. Prior to Admission medications    Medication Sig Start Date End Date Taking? Authorizing Provider   atorvastatin (LIPITOR) 80 mg tablet Take 80 mg by mouth daily. Yes Provider, Historical   amLODIPine (NORVASC) 5 mg tablet Take 1 Tablet by mouth daily. 8/12/21  Yes Jurgen Mcneill MD   meclizine (ANTIVERT) 25 mg tablet Take 1 Tablet by mouth three (3) times daily as needed for Dizziness for up to 10 days. 8/11/21 8/21/21 Yes Jurgen Mcneill MD   aspirin delayed-release 325 mg tablet Take 81 mg by mouth daily. Yes Provider, Historical   metoprolol succinate (Toprol XL) 100 mg tablet Take 100 mg by mouth daily. Yes Provider, Historical     No Known Allergies   Social History     Tobacco Use    Smoking status: Never Smoker    Smokeless tobacco: Never Used   Substance Use Topics    Alcohol use: Yes     Comment: some      Family History   Problem Relation Age of Onset    Hypertension Father     Heart Disease Father     Ataxia Father          Subjective:      Dior Rosenberg is a 59 y.o. RHWM with history of CVA, hypertension, hyperlipidemia and vertigo was here for further evaluation after a recent stroke.     Patient was admitted at [address] 7/15/2021 for numbness in the right shoulder and leg. Per patient condition started 1 day prior to admission when he developed numbness of the right leg and right shoulder. This improve and then was mainly localized to the right anterolateral chest area and flank. Persistence led to the patient to go to the ER. Also mentions issues with dizziness. In the ER blood pressure was 172/99. Laboratory work-up revealed increased WBC at 11.9, slightly increased hemoglobin A1c at 5.8, increased LDL at 131.6. CMP and troponin were unremarkable. Head CT without contrast did not reveal any acute process. Brain MRI revealed small left acute thalamic infarct. Carotid Doppler study revealed 0 to 49% bilateral ICA stenosis. When seen, patient reports resolution of his symptoms. Denies any weakness. No speech changes. Neurological examination revealed mainly decreased pinprick right shin and foot. Patient was started on aspirin 81 mg daily and atorvastatin 80 mg daily for stroke prophylaxis. Advised to monitor his blood pressures at home. Advised to establish care with a primary physician. Patient informed per [facility] regulation he can drive for 6 months. Patient was discharged 7/16/2021. Patient was then admitted again at [place] 8/9/2021 for vertigo. Per patient he was walking at [facility] when he suddenly had an onset of dizziness and room spinning sensation associated with nausea, hot flash and profuse sweating. Denies any chest pains or palpitations. No loss of consciousness. No focal weakness or numbness, speech changes or visual disturbance. Right side of his neck just felt sore. EMS was called patient was brought to the ER. In the ER blood pressure was 199/108. NIH stroke scale was 0. CBC revealed increased WBC at 14.3, CMP revealing decreased sodium at 131, increased potassium at 6.7, increased glucose, decreased calcium, increased AST.   Urinalysis revealed possible UTI.  TSH, troponin, phosphorus and magnesium were unremarkable. Chest x-ray did not reveal any acute process. Head CT without contrast revealed no acute findings. Subtle chronic left thalamic lacunar infarct. Head and neck CTA did not reveal any flow-limiting stenosis or aneurysm. No ICA stenosis. When seen patient reports just mild lightheadedness but no other symptoms. Exam revealed right primary and and gaze nystagmus. Wide-based and slightly unsteady gait. Brain MRI without contrast did not reveal any acute stroke. Condition likely secondary to peripheral etiology of vertigo aggravated by severe hypertension. Echocardiogram with bubble study revealed EF of 55 to 60%. No shunting. Patient was discharged 8/11/2021. Since discharge, patient reports no recurrence of severe vertigo or hot feeling and profuse sweating. Blood pressures at home have been good. 130/80 or less. Certain head movements does trigger a sense of lightheadedness and dizziness that is brief. When he walks he still feels unsteady and wobbly. He still has the sensation of tightness off of his right chest and flank area. No issues of numbness involving right face arm or leg. He is compliant with his aspirin 81 mg daily and atorvastatin 80 mg daily. Denies any side effects. He is supposed to start physical therapy tomorrow. Patient also complains of left occiput headache that radiates to his temple. Moderate and sharp in intensity and is episodic. No associated signs and symptoms. Outside reports reviewed: ER records, radiology reports, lab reports, historical medical records.     Review of Systems:    A comprehensive review of systems was performed:   Constitutional: positive for none  Eyes: positive for vision problems  Ears, nose, mouth, throat, and face: positive for none  Respiratory: positive for none  Cardiovascular: positive for right sided chest pain, high blood pressure  Gastrointestinal: positive for constipation  Genitourinary: positive for none  Integument/breast: positive for none  Hematologic/lymphatic: positive for none  Musculoskeletal: positive for none  Neurological: positive for headaches  Behavioral/Psych: positive for none  Endocrine: positive for none  Allergic/Immunologic: positive for none      Objective:     Visit Vitals  /78 (BP 1 Location: Right arm, BP Patient Position: Sitting, BP Cuff Size: Large adult)   Pulse 61   Ht 6' 2\" (1.88 m)   Wt 126.6 kg (279 lb)   SpO2 95%   BMI 35.82 kg/m²     PHYSICAL EXAM:    NEUROLOGICAL EXAM:    Appearance: The patient is obese, provides a coherent history and is in no acute distress. Mental Status: Oriented to time, place and person. Fluent, no aphasia or dysarthria. Mood and affect appropriate. Cranial Nerves:   Intact visual fields. RHETT, EOM's full, no nystagmus, no ptosis. Facial sensation is normal. Corneal reflexes are intact. Facial movement is symmetric. Hearing is normal bilaterally. Palate is midline with normal elevation. Sternocleidomastoid and trapezius muscles are normal. Tongue is midline. Motor:  5/5 strength in upper and lower proximal and distal muscles. Normal bulk and tone. No pronator drift. Reflexes:   Deep tendon reflexes 1+/4 and symmetrical. Downgoing toes. Sensory:   Normal to cold, pinprick and vibration. Gait:  Unsteady gait. No Romberg but with significant truncal instability. Problems with tandem walking. Tremor:   No tremor noted. Cerebellar:  Intact FTN/ALEXEY/HTS. Anterior head posture with shoulders rotated in. Positive tenderness on palpation left occiput    Imaging  CT Head x 2, head/neck CTA, brain MRI x 2: reviewed    Labs Reviewed      Assessment:   Acute CVA  Gait instability  Vertigo  Hypertension  Cervicogenic headaches    Plan:   Neurological examination does not reveal any residual right sensory deficit. Patient does have subjective right trunk and flank sense of tightness and discomfort. Exam also reveals significant truncal instability and problems with his gait. Status post left thalamic infarct. Head CT without contrast did not reveal any acute process. Brain MRI revealed small left acute thalamic infarct. Carotid Doppler study revealed 0 to 49% bilateral ICA stenosis. Echocardiogram done was unremarkable. LDL was elevated. Continue aspirin 81 mg daily for stroke prophylaxis. Patient to start physical therapy. Call 911 if new deficits occur. Per [employer] regulation patient cannot drive for 6 months. FMLA forms were filled out for patient to be off of work for the next 60 days to maximize physical therapy and allow for optimal functioning. Patient having significant issues with gait instability currently. Likely combination of potential issues related to stroke and peripheral vestibular problems. Patient to start physical therapy to help improve his sense of balance and stability. This issue currently will affect patient's employment and the need to be off of work for the next 60 days. Sudden onset of vertigo associated with nausea, hot flash and profuse sweating is concerning for potential cardiac etiology. Patient referred to Dr. Lin Recinos for further evaluation and likely need for event monitoring or loop recorder. LDL was previously elevated. Should be less than 70. Advised strict compliance with dietary modifications and medications for hyperlipidemia. Continue atorvastatin 80 mg daily. Hypertension is not major stroke risk for the patient. Continue home monitoring. Advised to compliance with dietary modifications and medications for hypertension. Exam reveals elements of cervicogenic headaches due to poor anterior head posture. Patient was advised to correct his anterior head posture. Use headrest at work, at home and while driving. Okay to take OTC medication for relief. All questions and concerns were answered. Visit lasted 60 minutes.   Greater 50% was spent reviewing his medical records as summarized above, actual review of his brain MRI films, discussion about his condition, etiology, prognosis, need to do physical therapy for vestibular rehabilitation, balance and gait improvement, referral to cardiology to look for cardiac arrhythmia or other possible cardiac issues, continued monitoring of his blood pressure, compliance with dietary modifications medications for hyperlipidemia, posture changes, treatment options, medication, FMLA forms filled out    This note was created using voice recognition software. Despite editing, there may be syntax errors.

## 2021-08-20 ENCOUNTER — HOSPITAL ENCOUNTER (OUTPATIENT)
Dept: PHYSICAL THERAPY | Age: 61
Discharge: HOME | End: 2021-08-20
Attending: INTERNAL MEDICINE
Payer: COMMERCIAL

## 2021-08-20 PROCEDURE — 97112 NEUROMUSCULAR REEDUCATION: CPT | Performed by: PHYSICAL THERAPIST

## 2021-08-20 PROCEDURE — 97110 THERAPEUTIC EXERCISES: CPT | Performed by: PHYSICAL THERAPIST

## 2021-08-20 PROCEDURE — 97162 PT EVAL MOD COMPLEX 30 MIN: CPT | Performed by: PHYSICAL THERAPIST

## 2021-08-20 NOTE — PROGRESS NOTES
PT INITIAL EVALUATION NOTE 2-15    Patient Name: Nate Landry  Date:2021  : 1960  [x]  Patient  Verified  Payor: BLUE CROSS / Plan: Jann Sanders / Product Type: HMO /    In time:11:00 AM  Out time:12:00 PM  Total Treatment Time (min): 60  Visit #: 1     Treatment Area: Other abnormalities of gait and mobility [R26.89]  Dizziness and giddiness [R42]    SUBJECTIVE  Pain Level (0-10 scale): 0  Any medication changes, allergies to medications, adverse drug reactions, diagnosis change, or new procedure performed?: [] No    [x] Yes (see summary sheet for update)  Subjective:     Dizziness, gait instability  PLOF: Less limitations with ambulation, no dizziness  Mechanism of Injury: Patient suffered a CVA on 21 and was admitted to [facility] for R upper body weakness. He was discharged to home with no residual effects of the stroke. On 21 he experienced an episode of dizziness and profuse sweating and was readmitted to [facility]. CT scan ruled out any further infarct and he was diagnosed with vestibular hypofunction. Previous Treatment/Compliance: [facility] and was referred to PT. Continues to take meclizine that was prescribed at the hospital on 21. He has taken it every day since and he notices an increase in dizziness when he doesn't take it. PMHx/Surgical Hx: Obesity  Work Hx: , bus technician, currently out of work on medical leave. Living Situation: lives in a two story home with wife. Pt Goals: to improve balance, return to work  Barriers: PMH  Motivation: motivated  Substance use: none   Cognition: A & O x 4        OBJECTIVE/EXAMINATION    Strength:  All cervical and lumbar myotomes: >4/5                        Sensation: Intact    Vision:   Fixation:Negative   Smooth Pursuit: Negative   VOR cancellation (VORc): Negative   Gaze stabilization: Negative   *all performed in sitting    Balance/ Equilibrium:          Standing Balance:    Rhomber sec with significant sagittal plane sway   Sharpened Rhomber sec        Single Leg Stance:   R  3 Seconds L 5 Seconds     Eyes Closed: Unable to perform    Jenny-Hallpike: NT    Functional Mobility        Transfers:       Sit-Stand: Independent      Gait:Patient ambulates with wide-base of support, lack of arm swing/trunk rotation, normal speed     10 min Neuromuscular Re-education:  [x]  See flow sheet :   Rationale: improve balance and increase proprioception  to improve the patients ability to walk without LOB            With   [] TE   [] TA   [] Neuro   [] SC   [] other: Patient Education: [x] Review HEP    [] Progressed/Changed HEP based on:   [] positioning   [] body mechanics   [] transfers   [] heat/ice application    [] other:        Other Objective/Functional Measures: FOTO Functional Measure: 66/100                  Pain Level (0-10 scale) post treatment: 0      ASSESSMENT:      [x]  See Plan of Kobe Peters, PT 2021

## 2021-08-24 NOTE — PROGRESS NOTES
Physical Therapy at [facility],   a part of  [facility]  [address] [facility], [address]  Phone: [phone]  Fax: [phone]    Plan of Care/ Statement of Necessity for Physical Therapy Services 2-15    Patient name: Javier Henderson  : 1960  Provider#: [id number]  Referral source: Marion Yap MD      Medical/Treatment Diagnosis: Other abnormalities of gait and mobility [R26.89]  Dizziness and giddiness [R42]     Prior Hospitalization: see medical history     Comorbidities: Obesity  Prior Level of Function: see initial eval  Medications: Verified on Patient Summary List    Start of Care: 21      Onset Date: 21       The Plan of Care and following information is based on the information from the initial evaluation. Assessment/ key information: Patient presents with dizziness and vestibular hypofunction following an episode two weeks ago that left with him with difficulty ambulating and turning his head. Evaluation Complexity History MEDIUM  Complexity : 1-2 comorbidities / personal factors will impact the outcome/ POC ; Examination MEDIUM Complexity : 3 Standardized tests and measures addressing body structure, function, activity limitation and / or participation in recreation  ;Presentation MEDIUM Complexity : Evolving with changing characteristics  ; Clinical Decision Making MEDIUM Complexity : FOTO score of 26-74  Overall Complexity Rating: MEDIUM    Problem List: pain affecting function, decrease ROM, decrease strength, impaired gait/ balance, decrease ADL/ functional abilitiies, decrease activity tolerance, decrease flexibility/ joint mobility and decrease transfer abilities   Treatment Plan may include any combination of the following: Therapeutic exercise, Therapeutic activities, Neuromuscular re-education, Gait/balance training, Patient education, Self Care training, Functional mobility training and Home safety training  Patient / Family readiness to learn indicated by: asking questions, trying to perform skills and interest  Persons(s) to be included in education: patient (P)  Barriers to Learning/Limitations: None  Patient Goal (s): I want to be able to walk with less dizziness.   Patient Self Reported Health Status: excellent  Rehabilitation Potential: excellent    Short Term Goals: To be accomplished in 4 weeks:  1. Patient will be able to demonstrate a sharpened Romberg test >10 seconds to allow for decreased fall risk. 2. Patient will be able to perform >10 SLS B to allow for decreased fall risk. 3. Patient will be able to ambulate 1/4 mile without a feeling of fatigue or dizziness. Long Term Goals: To be accomplished in 12 weeks:  1. Patient will be able to demonstrate a sharpened Romberg test >20 seconds to allow for decreased fall risk. 2. Patient will be able to perform >20 SLS B to allow for decreased fall risk. 3. Patient will be able to ambulate 1/2 mile without a feeling of fatigue or dizziness. Frequency / Duration: Patient to be seen 2 times per week for 12 weeks. Patient/ Caregiver education and instruction: self care, activity modification and exercises    [x]  Plan of care has been reviewed with MALLORY Prakash, PT 8/24/2021     ________________________________________________________________________    I certify that the above Therapy Services are being furnished while the patient is under my care. I agree with the treatment plan and certify that this therapy is necessary.     Physician's Signature:____________________  Date:____________Time: _________      Farhan Butt MD

## 2021-08-25 ENCOUNTER — HOSPITAL ENCOUNTER (OUTPATIENT)
Dept: PHYSICAL THERAPY | Age: 61
Discharge: HOME | End: 2021-08-25
Attending: INTERNAL MEDICINE
Payer: COMMERCIAL

## 2021-08-25 PROCEDURE — 97112 NEUROMUSCULAR REEDUCATION: CPT | Performed by: PHYSICAL THERAPIST

## 2021-08-25 NOTE — PROGRESS NOTES
PT DAILY TREATMENT NOTE 2-15    Patient Name: Angie January  Date:2021  : 1960  [x]  Patient  Verified  Payor: CARMEN ALEXIS / Plan: Manav Duong / Product Type: HMO /    In time:1:00 PM Out time:1:30 PM  Total Treatment Time (min): 30  Visit #: 2    Treatment Area: Other abnormalities of gait and mobility [R26.89]  Dizziness and giddiness [R42]    SUBJECTIVE  Pain Level (0-10 scale): 0  Any medication changes, allergies to medications, adverse drug reactions, diagnosis change, or new procedure performed?: [x] No    [] Yes (see summary sheet for update)  Subjective functional status/changes:   [] No changes reported  Patient reports feeling much better overall. He has had no episodes of dizziness and has been able to perform his HEP regularly. OBJECTIVE    30 min Neuromuscular Re-education:  [x]? See flow sheet :   Rationale: improve balance and increase proprioception  to improve the patients ability to walk without LOB               With   [] TE   [] TA   [] Neuro   [] SC   [] other: Patient Education: [x] Review HEP    [] Progressed/Changed HEP based on:   [] positioning   [] body mechanics   [] transfers   [] heat/ice application    [] other:      Other Objective/Functional Measures:   SLS: 30 sec B  Tandem stance: 30 sec B  Romber sec     Pain Level (0-10 scale) post treatment: 0    ASSESSMENT/Changes in Function:   Excellent improvement so far with dynamic balance. Patient will continue to benefit from skilled PT services to modify and progress therapeutic interventions, address functional mobility deficits, address ROM deficits, address strength deficits, analyze and address soft tissue restrictions, analyze and cue movement patterns, analyze and modify body mechanics/ergonomics and assess and modify postural abnormalities to attain remaining goals.      []  See Plan of Care  []  See progress note/recertification  []  See Discharge Summary         Progress towards goals / Updated goals:  Patient is showing excellent progress towards short term goals.     PLAN  [x]  Upgrade activities as tolerated     [x]  Continue plan of care  []  Update interventions per flow sheet       []  Discharge due to:_  []  Other:_      Herminia Taveras, PT 8/25/2021

## 2021-08-26 ENCOUNTER — OFFICE VISIT (OUTPATIENT)
Dept: CARDIOLOGY CLINIC | Age: 61
End: 2021-08-26
Payer: COMMERCIAL

## 2021-08-26 VITALS
HEIGHT: 74 IN | DIASTOLIC BLOOD PRESSURE: 72 MMHG | HEART RATE: 66 BPM | SYSTOLIC BLOOD PRESSURE: 118 MMHG | WEIGHT: 284.8 LBS | BODY MASS INDEX: 36.55 KG/M2 | OXYGEN SATURATION: 95 %

## 2021-08-26 DIAGNOSIS — Z86.73 CEREBROVASCULAR ACCIDENT, OLD: ICD-10-CM

## 2021-08-26 DIAGNOSIS — R42 ACUTE SEVERE VERTIGO: ICD-10-CM

## 2021-08-26 DIAGNOSIS — I63.9 ACUTE CVA (CEREBROVASCULAR ACCIDENT) (HCC): Primary | ICD-10-CM

## 2021-08-26 PROCEDURE — 99204 OFFICE O/P NEW MOD 45 MIN: CPT | Performed by: SPECIALIST

## 2021-08-26 NOTE — PROGRESS NOTES
Orders for 2 day exercise cardiolite when possible, CRISTINA, COVID testing 3 days prior; See me in 6 weeks    per Dr. Beryl MAYO.   Dx: cva

## 2021-08-26 NOTE — PROGRESS NOTES
Yessy Colon MD. [facility] [place]              Patient: Aleksandra Song  : 1960      Today's Date: 2021          HISTORY OF PRESENT ILLNESS:     History of Present Illness:  Reason for consult:  S/p CVA with episode of severe vertigo, hot flash sensation and significant diaphoresis. Assess for cardiac arrhythmia with event monitor or loop or other workup you deem necessary. Dr. Gilda Nicole noted \"Patient was admitted at [facility] last 7/15/2021 for numbness in the right shoulder and leg. In the ER blood pressure was 172/99.  Brain MRI revealed small left acute thalamic infarct. Patient was started on aspirin 81 mg daily and atorvastatin 80 mg daily for stroke prophylaxis. Patient was then admitted again at [facility] 2021 for vertigo. Per patient he was walking at [facility] when he suddenly had an onset of dizziness and room spinning sensation associated with nausea, hot flash and profuse sweating. EMS was called patient was brought to the ER.  In the ER blood pressure was 199/108.  Head CT without contrast revealed no acute findings. Brain MRI without contrast did not reveal any acute stroke. Condition likely secondary to peripheral etiology of vertigo aggravated by severe hypertension. Since discharge, patient reports no recurrence of severe vertigo or hot feeling and profuse sweating. Blood pressures at home have been good. 130/80 or less. Certain head movements does trigger a sense of lightheadedness and dizziness that is brief. When he walks he still feels unsteady and wobbly. \"    He is doing better past few days - stronger every day. Dizziness is better. BP is better. No CP or SOB. Getting PT. Has random right sided CP worse with twisting.              PAST MEDICAL HISTORY:     Past Medical History:   Diagnosis Date    CVA (cerebral vascular accident) ([address]     HTN (hypertension)     Hyperlipidemia     Vertigo            MEDICATIONS: Current Outpatient Medications   Medication Sig Dispense Refill    atorvastatin (LIPITOR) 80 mg tablet Take 80 mg by mouth daily.  amLODIPine (NORVASC) 5 mg tablet Take 1 Tablet by mouth daily. 30 Tablet 0    aspirin delayed-release 81 mg tablet Take 81 mg by mouth daily.  metoprolol succinate (Toprol XL) 100 mg tablet Take 100 mg by mouth daily. No Known Allergies        SOCIAL HISTORY:     Social History     Tobacco Use    Smoking status: Never Smoker    Smokeless tobacco: Never Used   Vaping Use    Vaping Use: Never used   Substance Use Topics    Alcohol use: Yes     Comment: some    Drug use: Never         FAMILY HISTORY:     Family History   Problem Relation Age of Onset    Hypertension Father     Heart Disease Father     Ataxia Father              REVIEW OF SYMPTOMS:     Review of Symptoms:  Constitutional: Negative for fever, chills  HEENT: Negative for nosebleeds, tinnitus, and vision changes. Respiratory: Negative for cough, wheezing  Cardiovascular: Negative for orthopnea, claudication, syncope, and PND. Gastrointestinal: Negative for abdominal pain, diarrhea, melena. Genitourinary: Negative for dysuria  Musculoskeletal: Negative for myalgias. Skin: Negative for rash  Heme: No problems bleeding. Neurological: Negative for speech change and focal weakness. CVA 7/21        PHYSICAL EXAM:     Physical Exam:  Visit Vitals  /72 (BP 1 Location: Left lower arm, BP Patient Position: Sitting)   Pulse 66   Ht 6' 2\" (1.88 m)   Wt 284 lb 12.8 oz (129.2 kg)   SpO2 95%   BMI 36.57 kg/m²     Patient appears generally well, mood and affect are appropriate and pleasant. HEENT:  Hearing intact, non-icteric, normocephalic, atraumatic. Neck Exam: Supple, No JVD or carotid bruits. Lung Exam: Clear to auscultation, even breath sounds. Cardiac Exam: Regular rate and rhythm with no murmur or rub  Abdomen: Soft, non-tender, normal bowel sounds.  No bruits or masses. Extremities: Moves all ext well. No lower extremity edema. MSKTL: Overall good ROM ext  Skin: No significant rashes  Vascular: 2+ dorsalis pedis pulses bilaterally. Psych: Appropriate affect  Neuro - Grossly intact      LABS / OTHER STUDIES reviewed:     Lab Results   Component Value Date/Time    Sodium 136 08/10/2021 05:35 AM    Potassium 3.8 08/10/2021 05:35 AM    Chloride 103 08/10/2021 05:35 AM    CO2 28 08/10/2021 05:35 AM    Anion gap 5 08/10/2021 05:35 AM    Glucose 118 (H) 08/10/2021 05:35 AM    BUN 11 08/10/2021 05:35 AM    Creatinine 0.51 (L) 08/10/2021 05:35 AM    BUN/Creatinine ratio 22 (H) 08/10/2021 05:35 AM    GFR est AA >60 08/10/2021 05:35 AM    GFR est non-AA >60 08/10/2021 05:35 AM    Calcium 8.2 (L) 08/10/2021 05:35 AM    Bilirubin, total 1.0 08/09/2021 09:30 PM    Alk. phosphatase 69 08/09/2021 09:30 PM    Protein, total 8.6 (H) 08/09/2021 09:30 PM    Albumin 3.6 08/09/2021 09:30 PM    Globulin 5.0 (H) 08/09/2021 09:30 PM    A-G Ratio 0.7 (L) 08/09/2021 09:30 PM    ALT (SGPT) 33 08/09/2021 09:30 PM    AST (SGOT) 64 (H) 08/09/2021 09:30 PM     Lab Results   Component Value Date/Time    WBC 11.0 08/11/2021 03:05 AM    HGB 13.2 08/11/2021 03:05 AM    HCT 41.0 08/11/2021 03:05 AM    PLATELET 519 33/43/4182 03:05 AM    MCV 85.4 08/11/2021 03:05 AM       Lab Results   Component Value Date/Time    Cholesterol, total 132 08/10/2021 05:35 AM    HDL Cholesterol 43 08/10/2021 05:35 AM    LDL, calculated 78.4 08/10/2021 05:35 AM    VLDL, calculated 10.6 08/10/2021 05:35 AM    Triglyceride 53 08/10/2021 05:35 AM    CHOL/HDL Ratio 3.1 08/10/2021 05:35 AM       Lab Results   Component Value Date/Time    TSH 0.45 08/10/2021 05:35 AM           CARDIAC DIAGNOSTICS:     Cardiac Evaluation Includes:  I reviewed the results below. Brain MRI 7/15/21 - Tiny, acute, left thalamic infarction. CTA Head / neck 8/21 - 1.   No evidence of large vessel occlusion or hemodynamically significant carotid Stenosis. 2.  No intracranial aneurysm. 3.  No hemorrhage in the area of the prior left basal ganglia lacunar infarct. Echo 8/10/21 - TDS - LVEF 55-60%, negative bubble study - I viewed images myself       EKG 7/16/21 - NSR, Possible Inferior infarct;  T wave abnormality, consider lateral ischemia  EKG 8/9/21 - NSR, NSST changes, prolonged QT      ASSESSMENT AND PLAN:     Assessment and Plan:    1) Acute CVA 7/21 with Vertigo 8/21  - cont statin and ASA  - check an event monitor to evaluate for Afib  - Check a CRISTINA (reviewed risks) to evaluate for cardiogenic cause for syncope (TTE was a technically difficult study)      2) Abnormal EKG / atypical CP / Risk factors / Diaphoresis   - Check a 2 day lexiscan cardiolite     3) HTN  - BP OK - continue meds     4) Dyslipidemia   - cont statin     5) NOEL - sleep study pending     6) See me in 6 weeks.  ([facility]). Enjoys hunting, fishing. Lives with his wife. Ashlyn Jarquin MD, [address], [address] [place] [address] Drive  [address]  Ph: [phone]   Ph [phone]      ADDENDUM   9/13/2021  Event monitor 9/11/21 at 12:58 sinus with 4.7 sec pause (asymptomatic)   I called and spoke to wife (left pt a VM)----> will cut Toprol XL to 25 mg daily       ADDENDUM   9/21/2021  Exercise cardiolite 9/21/21 - walked 6:30 (7 METS), Normal stress EKG and MPI. LVEF 58%    Will have nurse call with normal findings.        ADDENDUM   10/11/2021  Event Monitor 9/5/21-10/4/21 - NSR, Avg HR 70 bpm,  2 pauses, longest 4.7 sec,  No Afib    ---> Toprol XL cut to 25 mg after 4.7 sec pause found

## 2021-08-26 NOTE — PROGRESS NOTES
Kathie Warren is a 59 y.o. male    Chief Complaint   Patient presents with    New Patient     vertigo CVA    Hypertension       Chest pain No    SOB No    Dizziness No    Swelling No    Refills No    Visit Vitals  /72 (BP 1 Location: Left lower arm, BP Patient Position: Sitting)   Pulse 66   Ht 6' 2\" (1.88 m)   Wt 284 lb 12.8 oz (129.2 kg)   SpO2 95%   BMI 36.57 kg/m²       1. Have you been to the ER, urgent care clinic since your last visit? Hospitalized since your last visit? Ed 7/15 and [facility] 8/9-8/11    2. Have you seen or consulted any other health care providers outside of the [address] [place] [address] since your last visit? Include any pap smears or colon screening.   no

## 2021-08-31 ENCOUNTER — TRANSCRIBE ORDER (OUTPATIENT)
Dept: REGISTRATION | Age: 61
End: 2021-08-31

## 2021-08-31 ENCOUNTER — HOSPITAL ENCOUNTER (OUTPATIENT)
Dept: LAB | Age: 61
Discharge: HOME | End: 2021-08-31
Payer: COMMERCIAL

## 2021-08-31 DIAGNOSIS — Z01.812 ENCOUNTER FOR PREOPERATIVE SCREENING LABORATORY TESTING FOR COVID-19 VIRUS: ICD-10-CM

## 2021-08-31 DIAGNOSIS — Z01.812 ENCOUNTER FOR PREOPERATIVE SCREENING LABORATORY TESTING FOR COVID-19 VIRUS: Primary | ICD-10-CM

## 2021-08-31 DIAGNOSIS — Z20.822 ENCOUNTER FOR PREOPERATIVE SCREENING LABORATORY TESTING FOR COVID-19 VIRUS: ICD-10-CM

## 2021-08-31 DIAGNOSIS — Z20.822 ENCOUNTER FOR PREOPERATIVE SCREENING LABORATORY TESTING FOR COVID-19 VIRUS: Primary | ICD-10-CM

## 2021-08-31 PROCEDURE — U0005 INFEC AGEN DETEC AMPLI PROBE: HCPCS

## 2021-09-01 LAB
SARS-COV-2, XPLCVT: NOT DETECTED
SOURCE, COVRS: NORMAL

## 2021-09-03 ENCOUNTER — APPOINTMENT (OUTPATIENT)
Dept: PHYSICAL THERAPY | Age: 61
End: 2021-09-03
Attending: INTERNAL MEDICINE
Payer: COMMERCIAL

## 2021-09-03 ENCOUNTER — HOSPITAL ENCOUNTER (OUTPATIENT)
Dept: NON INVASIVE DIAGNOSTICS | Age: 61
Discharge: HOME | End: 2021-09-03
Attending: SPECIALIST | Admitting: SPECIALIST
Payer: COMMERCIAL

## 2021-09-03 VITALS
WEIGHT: 284 LBS | DIASTOLIC BLOOD PRESSURE: 62 MMHG | HEART RATE: 67 BPM | HEIGHT: 74 IN | RESPIRATION RATE: 17 BRPM | OXYGEN SATURATION: 97 % | BODY MASS INDEX: 36.45 KG/M2 | SYSTOLIC BLOOD PRESSURE: 134 MMHG

## 2021-09-03 DIAGNOSIS — Z86.73 CEREBROVASCULAR ACCIDENT, OLD: ICD-10-CM

## 2021-09-03 DIAGNOSIS — I63.9 ACUTE CVA (CEREBROVASCULAR ACCIDENT) (HCC): Primary | ICD-10-CM

## 2021-09-03 DIAGNOSIS — I63.9 ACUTE CVA (CEREBROVASCULAR ACCIDENT) (HCC): ICD-10-CM

## 2021-09-03 DIAGNOSIS — R42 ACUTE SEVERE VERTIGO: ICD-10-CM

## 2021-09-03 PROCEDURE — 93312 ECHO TRANSESOPHAGEAL: CPT | Performed by: SPECIALIST

## 2021-09-03 PROCEDURE — 99152 MOD SED SAME PHYS/QHP 5/>YRS: CPT | Performed by: SPECIALIST

## 2021-09-03 PROCEDURE — 93320 DOPPLER ECHO COMPLETE: CPT | Performed by: SPECIALIST

## 2021-09-03 PROCEDURE — 99153 MOD SED SAME PHYS/QHP EA: CPT

## 2021-09-03 PROCEDURE — 99153 MOD SED SAME PHYS/QHP EA: CPT | Performed by: SPECIALIST

## 2021-09-03 PROCEDURE — 99152 MOD SED SAME PHYS/QHP 5/>YRS: CPT

## 2021-09-03 PROCEDURE — 74011250636 HC RX REV CODE- 250/636: Performed by: SPECIALIST

## 2021-09-03 PROCEDURE — 93325 DOPPLER ECHO COLOR FLOW MAPG: CPT | Performed by: SPECIALIST

## 2021-09-03 PROCEDURE — 74011000250 HC RX REV CODE- 250: Performed by: SPECIALIST

## 2021-09-03 PROCEDURE — 96374 THER/PROPH/DIAG INJ IV PUSH: CPT

## 2021-09-03 RX ORDER — LIDOCAINE HYDROCHLORIDE 20 MG/ML
15 SOLUTION OROPHARYNGEAL
Status: DISCONTINUED | OUTPATIENT
Start: 2021-09-03 | End: 2021-09-03 | Stop reason: HOSPADM

## 2021-09-03 RX ORDER — MIDAZOLAM HYDROCHLORIDE 1 MG/ML
.5-1 INJECTION, SOLUTION INTRAMUSCULAR; INTRAVENOUS
Status: DISCONTINUED | OUTPATIENT
Start: 2021-09-03 | End: 2021-09-03 | Stop reason: HOSPADM

## 2021-09-03 RX ORDER — SODIUM CHLORIDE 0.9 % (FLUSH) 0.9 %
5-40 SYRINGE (ML) INJECTION EVERY 8 HOURS
Status: CANCELLED | OUTPATIENT
Start: 2021-09-03

## 2021-09-03 RX ORDER — SODIUM CHLORIDE 0.9 % (FLUSH) 0.9 %
5-40 SYRINGE (ML) INJECTION AS NEEDED
Status: CANCELLED | OUTPATIENT
Start: 2021-09-03

## 2021-09-03 RX ORDER — ASPIRIN 325 MG
325 TABLET ORAL DAILY
COMMUNITY

## 2021-09-03 RX ORDER — SODIUM CHLORIDE 9 MG/ML
10 INJECTION INTRAMUSCULAR; INTRAVENOUS; SUBCUTANEOUS
Status: DISCONTINUED | OUTPATIENT
Start: 2021-09-03 | End: 2021-09-03 | Stop reason: HOSPADM

## 2021-09-03 RX ORDER — LIDOCAINE HYDROCHLORIDE 20 MG/ML
JELLY TOPICAL
Status: COMPLETED | OUTPATIENT
Start: 2021-09-03 | End: 2021-09-03

## 2021-09-03 RX ORDER — FENTANYL CITRATE 50 UG/ML
25-200 INJECTION, SOLUTION INTRAMUSCULAR; INTRAVENOUS
Status: DISCONTINUED | OUTPATIENT
Start: 2021-09-03 | End: 2021-09-03 | Stop reason: HOSPADM

## 2021-09-03 RX ORDER — LIDOCAINE 50 MG/G
OINTMENT TOPICAL
Status: DISCONTINUED | OUTPATIENT
Start: 2021-09-03 | End: 2021-09-03 | Stop reason: HOSPADM

## 2021-09-03 RX ADMIN — LIDOCAINE HYDROCHLORIDE 15 ML: 20 SOLUTION ORAL at 12:33

## 2021-09-03 RX ADMIN — LIDOCAINE HYDROCHLORIDE: 20 JELLY TOPICAL at 12:49

## 2021-09-03 RX ADMIN — BENZOCAINE 2 SPRAY: 200 SPRAY DENTAL; ORAL; PERIODONTAL at 12:51

## 2021-09-03 RX ADMIN — MIDAZOLAM 1 MG: 1 INJECTION INTRAMUSCULAR; INTRAVENOUS at 13:02

## 2021-09-03 RX ADMIN — FENTANYL CITRATE 25 MCG: 50 INJECTION INTRAMUSCULAR; INTRAVENOUS at 12:59

## 2021-09-03 RX ADMIN — MIDAZOLAM 2 MG: 1 INJECTION INTRAMUSCULAR; INTRAVENOUS at 12:59

## 2021-09-03 RX ADMIN — SODIUM CHLORIDE 10 ML: 9 INJECTION INTRAMUSCULAR; INTRAVENOUS; SUBCUTANEOUS at 13:13

## 2021-09-03 RX ADMIN — LIDOCAINE: 50 OINTMENT TOPICAL at 12:39

## 2021-09-03 NOTE — PROCEDURES
Merle Fuentes MD. [facility] [place]              Patient: Tray Colón  : 1960      Today's Date: 9/3/2021        BRIEF CRISTINA PROCEDURE NOTE    Date of Procedure: 9/3/2021   Preoperative Diagnosis: CVA  Postoperative Diagnosis: Normal CRISTINA   Procedure: Transesophageal Echo  Cardiologist: Kathie Oro MD  Anesthesia: local + IV sedation  Estimated Blood Loss: None  Specimens Removed: None  Assistants: None  Grafts, transplants, or devices implanted: None  Findings: LVEF 60%, no intracardiac messes, negative bubble study   See full CRISTINA note.   Complications: none

## 2021-09-03 NOTE — H&P
Benita Witt MD. [facility] [place]              Patient: Kavya Obrien  : 1960      Today's Date: 9/3/2021          HISTORY OF PRESENT ILLNESS:     History of Present Illness:  Patient is here for a CRISTINA as part of CVA workup. No change since I saw him 21. PAST MEDICAL HISTORY:     Past Medical History:   Diagnosis Date    CVA (cerebral vascular accident) ([address]     HTN (hypertension)     Hyperlipidemia     Vertigo            MEDICATIONS:     Current Facility-Administered Medications   Medication Dose Route Frequency Provider Last Rate Last Admin    benzocaine (HURRICANE) 20 % spray   Mucous Membrane Rad Conrado Perez MD        0.9% NaCl bacteriostatic (NORMAL SALINE) 0.9 % injection 10 mL  10 mL IntraVENous Rad Conrado Perez MD        lidocaine (XYLOCAINE) 5 % ointment   Topical Rad Conrado Perez MD        lidocaine (XYLOCAINE) 2 % viscous solution 15 mL  15 mL Mouth/Throat RAD PRN Veronica Perez MD   15 mL at 21 1233    lidocaine (URO-JET/ GLYDO) 2 % jelly   Mucous Membrane RAD Joshua Blanc MD        midazolam (VERSED) injection 0.5-10 mg  0.5-10 mg IntraVENous Rad Conrado Perez MD        fentaNYL citrate (PF) injection  mcg   mcg IntraVENous Rad Conrado Perez MD             No Known Allergies          SOCIAL HISTORY:     Social History     Tobacco Use    Smoking status: Never Smoker    Smokeless tobacco: Never Used   Vaping Use    Vaping Use: Never used   Substance Use Topics    Alcohol use: Yes     Comment: some    Drug use: Never       FAMILY HISTORY:     Family History   Problem Relation Age of Onset    Hypertension Father     Heart Disease Father     Ataxia Father          REVIEW OF SYMPTOMS:     Review of Symptoms:  Constitutional: Negative for fever, chills  HEENT: Negative for nosebleeds, tinnitus, and vision changes.    Respiratory: Negative for cough, wheezing  Cardiovascular: Negative for orthopnea, claudication, syncope, and PND. Gastrointestinal: Negative for abdominal pain, diarrhea, melena. Genitourinary: Negative for dysuria  Musculoskeletal: Negative for myalgias. Skin: Negative for rash  Heme: No problems bleeding. Neurological: Negative for speech change and focal weakness. PHYSICAL EXAM:     Physical Exam:  Visit Vitals  BP (!) 138/94 (BP 1 Location: Left upper arm, BP Patient Position: At rest)   Pulse 62   Resp 12   Ht 6' 2\" (1.88 m)   Wt 284 lb (128.8 kg)   SpO2 97%   BMI 36.46 kg/m²     Patient appears generally well, mood and affect are appropriate and pleasant. HEENT:  Hearing intact, non-icteric, normocephalic, atraumatic. Neck Exam: Supple   Lung Exam: Clear to auscultation, even breath sounds. Cardiac Exam: Regular rate and rhythm   Abdomen: Soft, non-tender   Extremities: Moves all ext well. No lower extremity edema. MSKTL: Overall good ROM ext  Skin: No significant rashes  Psych: Appropriate affect  Neuro - Grossly intact          LABS / OTHER STUDIES reviewed:     Lab Results   Component Value Date/Time    Sodium 136 08/10/2021 05:35 AM    Potassium 3.8 08/10/2021 05:35 AM    Chloride 103 08/10/2021 05:35 AM    CO2 28 08/10/2021 05:35 AM    Anion gap 5 08/10/2021 05:35 AM    Glucose 118 (H) 08/10/2021 05:35 AM    BUN 11 08/10/2021 05:35 AM    Creatinine 0.51 (L) 08/10/2021 05:35 AM    BUN/Creatinine ratio 22 (H) 08/10/2021 05:35 AM    GFR est AA >60 08/10/2021 05:35 AM    GFR est non-AA >60 08/10/2021 05:35 AM    Calcium 8.2 (L) 08/10/2021 05:35 AM    Bilirubin, total 1.0 08/09/2021 09:30 PM    Alk.  phosphatase 69 08/09/2021 09:30 PM    Protein, total 8.6 (H) 08/09/2021 09:30 PM    Albumin 3.6 08/09/2021 09:30 PM    Globulin 5.0 (H) 08/09/2021 09:30 PM    A-G Ratio 0.7 (L) 08/09/2021 09:30 PM    ALT (SGPT) 33 08/09/2021 09:30 PM    AST (SGOT) 64 (H) 08/09/2021 09:30 PM       Lab Results   Component Value Date/Time    WBC 11.0 08/11/2021 03:05 AM    HGB 13.2 08/11/2021 03:05 AM    HCT 41.0 08/11/2021 03:05 AM    PLATELET 248 18/41/0449 03:05 AM    MCV 85.4 08/11/2021 03:05 AM             CARDIAC DIAGNOSTICS:     Cardiac Evaluation Includes:  I reviewed the results below. Brain MRI 7/15/21 - Tiny, acute, left thalamic infarction.     CTA Head / neck 8/21 - 1.  No evidence of large vessel occlusion or hemodynamically significant carotid Stenosis. 2.  No intracranial aneurysm. 3.  No hemorrhage in the area of the prior left basal ganglia lacunar infarct.        Echo 8/10/21 - TDS - LVEF 55-60%, negative bubble study - I viewed images myself         EKG 7/16/21 - NSR, Possible Inferior infarct;  T wave abnormality, consider lateral ischemia  EKG 8/9/21 - NSR, NSST changes, prolonged QT         ASSESSMENT AND PLAN:     Assessment and Plan:    Acute CVA 7/21 with Vertigo 8/21  - Check a CRISTINA (reviewed risks) to evaluate for cardiogenic cause for syncope (TTE was a technically difficult study)    - he was explained risks and benefits and agreed to proceed        Man Mireles MD, [address]  [address], [address] [place] [address]  Ph: [phone]   Ph [phone]

## 2021-09-03 NOTE — PROGRESS NOTES
Patient arrived to Non-Invasive Cardiology Lab for Out Patient CRISTINA Procedure. Staff introduced to patient. Patient identifiers verified with Name and Date of Birth. Procedure verified with patient. Consent forms reviewed and signed by patient or authorized representative and verified. Allergies verified. Patient informed of procedure and plan of care. Questions answered with review. Patient on cardiac monitor, non-invasive blood pressure, SPO2 monitor. Patient is A&Ox3. Patient reports no complaints. Patient belongings and valuables to remain in the room with patient. Patient on stretcher, in low position, with side rails up and brakes locked. Patient instructed to call for assistance as needed. Family in waiting room.

## 2021-09-03 NOTE — PROGRESS NOTES
Spoke with pt concerning CRISTINA procedure. (Pt [id number]). Instructions given to pt per VO of Dr. Sharad Lemus. Pt NPO after midnight; hold none and take all other meds with sip of water in AM; have someone available to drive pt to and from procedure; pack a bag in case an overnight stay is warranted. CRISTINA scheduled for 1300 on 9/3/21. Pt advised to arrive 2hrs prior to procedure for prep. Labs . Pt expressed understanding. Opportunities for questions, clarifications, and concerns provided. COVID test was negative.

## 2021-09-08 ENCOUNTER — APPOINTMENT (OUTPATIENT)
Dept: PHYSICAL THERAPY | Age: 61
End: 2021-09-08
Attending: INTERNAL MEDICINE
Payer: COMMERCIAL

## 2021-09-11 ENCOUNTER — TELEPHONE (OUTPATIENT)
Dept: CARDIOLOGY CLINIC | Age: 61
End: 2021-09-11

## 2021-09-13 RX ORDER — AMLODIPINE BESYLATE 5 MG/1
5 TABLET ORAL DAILY
Qty: 90 TABLET | Refills: 3 | Status: SHIPPED | OUTPATIENT
Start: 2021-09-13 | End: 2022-08-31

## 2021-09-13 RX ORDER — METOPROLOL SUCCINATE 25 MG/1
25 TABLET, EXTENDED RELEASE ORAL DAILY
Qty: 30 TABLET | Refills: 12 | Status: SHIPPED | OUTPATIENT
Start: 2021-09-13 | End: 2022-09-28

## 2021-09-15 ENCOUNTER — HOSPITAL ENCOUNTER (OUTPATIENT)
Dept: PHYSICAL THERAPY | Age: 61
Discharge: HOME | End: 2021-09-15
Attending: INTERNAL MEDICINE
Payer: COMMERCIAL

## 2021-09-15 DIAGNOSIS — G44.86 CERVICOGENIC HEADACHE: Primary | ICD-10-CM

## 2021-09-15 PROCEDURE — 97112 NEUROMUSCULAR REEDUCATION: CPT | Performed by: PHYSICAL MEDICINE & REHABILITATION

## 2021-09-15 RX ORDER — AMITRIPTYLINE HYDROCHLORIDE 10 MG/1
TABLET, FILM COATED ORAL
Qty: 90 TABLET | Refills: 0 | Status: SHIPPED | OUTPATIENT
Start: 2021-09-15 | End: 2021-10-11

## 2021-09-15 NOTE — PROGRESS NOTES
PT DAILY TREATMENT NOTE 2-15    Patient Name: Javier Henderson  Date:9/15/2021  : 1960  [x]  Patient  Verified  Payor: [employer] / Plan: Dalila Oats / Product Type: HMO /    In time:815 AM Out time:845 AM  Total Treatment Time (min): 30  Visit #: 3    Treatment Area: Other abnormalities of gait and mobility [R26.89]  Dizziness and giddiness [R42]    SUBJECTIVE  Pain Level (0-10 scale): 0  Any medication changes, allergies to medications, adverse drug reactions, diagnosis change, or new procedure performed?: [x] No    [] Yes (see summary sheet for update)  Subjective functional status/changes:   [] No changes reported  Patient reports he hasn't had any dizziness since last visit. Patient stated he feels ready for discharge. OBJECTIVE    30 min Neuromuscular Re-education:  [x]? See flow sheet :   Rationale: improve balance and increase proprioception  to improve the patients ability to walk without LOB               With   [] TE   [] TA   [] Neuro   [] SC   [] other: Patient Education: [x] Review HEP    [] Progressed/Changed HEP based on:   [] positioning   [] body mechanics   [] transfers   [] heat/ice application    [] other:      Other Objective/Functional Measures:   SLS: 30 sec B  EC Instep: 30 sec B     Pain Level (0-10 scale) post treatment: 0    ASSESSMENT/Changes in Function:      []  See Plan of Care  []  See progress note/recertification  [x]  See Discharge Summary         Progress towards goals / Updated goals:  Patient has met all STG and LTG. Patient has not had any dizziness within the last 2 weeks. Patient has significantly improved overall balance (EO/EC). Patient will continue with today's updated HEP at this time. Short Term Goals: To be accomplished in 4 weeks:  1. Patient will be able to demonstrate a sharpened Romberg test >10 seconds to allow for decreased fall risk. - MET with EO, progressing towards with EC  2.  Patient will be able to perform >10 SLS B to allow for decreased fall risk. - MET  3. Patient will be able to ambulate 1/4 mile without a feeling of fatigue or dizziness. - MET  Long Term Goals: To be accomplished in 12 weeks:  1. Patient will be able to demonstrate a sharpened Romberg test >20 seconds to allow for decreased fall risk. - MET with EO, progressing towards with EC  2. Patient will be able to perform >20 SLS B to allow for decreased fall risk. - MET  3.  Patient will be able to ambulate 1/2 mile without a feeling of fatigue or dizziness. - MET    PLAN  []  Upgrade activities as tolerated     []  Continue plan of care  []  Update interventions per flow sheet       [x]  Discharge due to: Goals Met  []  Other:_      Lottie Romero PTA, OPTA, CPT  9/15/2021

## 2021-09-20 ENCOUNTER — ANCILLARY PROCEDURE (OUTPATIENT)
Dept: CARDIOLOGY CLINIC | Age: 61
End: 2021-09-20
Payer: COMMERCIAL

## 2021-09-20 VITALS — BODY MASS INDEX: 36.45 KG/M2 | HEIGHT: 74 IN | WEIGHT: 284 LBS

## 2021-09-20 DIAGNOSIS — Z86.73 CEREBROVASCULAR ACCIDENT, OLD: ICD-10-CM

## 2021-09-20 DIAGNOSIS — R42 ACUTE SEVERE VERTIGO: ICD-10-CM

## 2021-09-20 DIAGNOSIS — I63.9 ACUTE CVA (CEREBROVASCULAR ACCIDENT) (HCC): ICD-10-CM

## 2021-09-20 PROCEDURE — A9500 TC99M SESTAMIBI: HCPCS | Performed by: SPECIALIST

## 2021-09-20 PROCEDURE — 78452 HT MUSCLE IMAGE SPECT MULT: CPT | Performed by: SPECIALIST

## 2021-09-20 PROCEDURE — 93015 CV STRESS TEST SUPVJ I&R: CPT | Performed by: SPECIALIST

## 2021-09-20 RX ORDER — TETRAKIS(2-METHOXYISOBUTYLISOCYANIDE)COPPER(I) TETRAFLUOROBORATE 1 MG/ML
10 INJECTION, POWDER, LYOPHILIZED, FOR SOLUTION INTRAVENOUS ONCE
Status: SHIPPED | OUTPATIENT
Start: 2021-09-20 | End: 2021-09-20

## 2021-09-20 RX ORDER — TETRAKIS(2-METHOXYISOBUTYLISOCYANIDE)COPPER(I) TETRAFLUOROBORATE 1 MG/ML
30 INJECTION, POWDER, LYOPHILIZED, FOR SOLUTION INTRAVENOUS ONCE
Status: COMPLETED | OUTPATIENT
Start: 2021-09-20 | End: 2021-09-20

## 2021-09-20 RX ADMIN — TETRAKIS(2-METHOXYISOBUTYLISOCYANIDE)COPPER(I) TETRAFLUOROBORATE 25.5 MILLICURIE: 1 INJECTION, POWDER, LYOPHILIZED, FOR SOLUTION INTRAVENOUS at 08:45

## 2021-09-21 ENCOUNTER — APPOINTMENT (OUTPATIENT)
Dept: CARDIOLOGY CLINIC | Age: 61
End: 2021-09-21

## 2021-09-21 LAB
STRESS ANGINA INDEX: 0
STRESS BASELINE DIAS BP: 64 MMHG
STRESS BASELINE HR: 75 BPM
STRESS BASELINE SYS BP: 124 MMHG
STRESS ESTIMATED WORKLOAD: 7 METS
STRESS EXERCISE DUR MIN: NORMAL
STRESS O2 SAT PEAK: 95 %
STRESS O2 SAT REST: 96 %
STRESS PEAK DIAS BP: 76 MMHG
STRESS PEAK SYS BP: 184 MMHG
STRESS PERCENT HR ACHIEVED: 87 %
STRESS POST PEAK HR: 137 BPM
STRESS RATE PRESSURE PRODUCT: NORMAL BPM*MMHG
STRESS ST DEPRESSION: 0 MM
STRESS ST ELEVATION: 0 MM
STRESS TARGET HR: 157 BPM

## 2021-09-21 RX ORDER — TETRAKIS(2-METHOXYISOBUTYLISOCYANIDE)COPPER(I) TETRAFLUOROBORATE 1 MG/ML
40 INJECTION, POWDER, LYOPHILIZED, FOR SOLUTION INTRAVENOUS ONCE
Status: COMPLETED | OUTPATIENT
Start: 2021-09-21 | End: 2021-09-21

## 2021-09-21 RX ADMIN — TETRAKIS(2-METHOXYISOBUTYLISOCYANIDE)COPPER(I) TETRAFLUOROBORATE 24.6 MILLICURIE: 1 INJECTION, POWDER, LYOPHILIZED, FOR SOLUTION INTRAVENOUS at 08:20

## 2021-09-23 ENCOUNTER — OFFICE VISIT (OUTPATIENT)
Dept: NEUROLOGY | Age: 61
End: 2021-09-23
Payer: COMMERCIAL

## 2021-09-23 VITALS
RESPIRATION RATE: 20 BRPM | HEART RATE: 68 BPM | DIASTOLIC BLOOD PRESSURE: 86 MMHG | SYSTOLIC BLOOD PRESSURE: 132 MMHG | OXYGEN SATURATION: 97 %

## 2021-09-23 DIAGNOSIS — R26.81 GAIT INSTABILITY: ICD-10-CM

## 2021-09-23 DIAGNOSIS — R42 VERTIGO: ICD-10-CM

## 2021-09-23 DIAGNOSIS — I63.9 ACUTE CVA (CEREBROVASCULAR ACCIDENT) (HCC): Primary | ICD-10-CM

## 2021-09-23 DIAGNOSIS — G44.86 CERVICOGENIC HEADACHE: ICD-10-CM

## 2021-09-23 DIAGNOSIS — E78.2 MIXED HYPERLIPIDEMIA: ICD-10-CM

## 2021-09-23 DIAGNOSIS — I10 ESSENTIAL HYPERTENSION: ICD-10-CM

## 2021-09-23 PROCEDURE — 99214 OFFICE O/P EST MOD 30 MIN: CPT | Performed by: PSYCHIATRY & NEUROLOGY

## 2021-09-23 NOTE — PROGRESS NOTES
NEUROLOGY CLINIC NOTE    Patient ID:  Jose Elias Mercado  [id number]  16 y.o.  1960    Date of Visit:  September 23, 2021    Reason for Visit:  Stroke, headache    Chief Complaint   Patient presents with    Follow-up     after CVA        History of Present Illness:     Patient Active Problem List    Diagnosis Date Noted    Leukocytosis 08/10/2021    Cerebrovascular accident, old 08/10/2021    Acute severe vertigo 08/10/2021    Cerebellar stroke syndrome 08/10/2021    Elevated blood pressure reading 07/16/2021    Acute CVA (cerebrovascular accident) ([facility] [address] 07/15/2021     Past Medical History:   Diagnosis Date    CVA (cerebral vascular accident) ([facility] [address]     HTN (hypertension)     Hyperlipidemia     Vertigo        No past surgical history on file. Prior to Admission medications    Medication Sig Start Date End Date Taking? Authorizing Provider   amitriptyline (ELAVIL) 10 mg tablet Take 1 tab at bedtime x 1 wk; then 2 tabs at bedtime x 1 wk; then 3 tabs at bedtime 9/15/21  Yes Skye Knowles MD   amLODIPine (NORVASC) 5 mg tablet Take 1 Tablet by mouth daily. 9/13/21  Yes Twin Hamilton MD   metoprolol succinate (TOPROL-XL) 25 mg XL tablet Take 1 Tablet by mouth daily. 9/13/21  Yes Twin Hamilton MD   aspirin (ASPIRIN) 325 mg tablet Take 325 mg by mouth daily. Yes Provider, Historical   atorvastatin (LIPITOR) 80 mg tablet Take 80 mg by mouth daily. Yes Provider, Historical     No Known Allergies   Social History     Tobacco Use    Smoking status: Never Smoker    Smokeless tobacco: Never Used   Substance Use Topics    Alcohol use: Yes     Comment: some      Family History   Problem Relation Age of Onset    Hypertension Father     Heart Disease Father     Ataxia Father          Subjective:      Jose Elias Mercado is a 59 y.o. RHWM with history of CVA, hypertension, hyperlipidemia and vertigo was here for further evaluation after a recent stroke. Patient is here for follow up.     Patient was admitted at [facility] last 7/15/2021 for numbness in the right shoulder and leg. Per patient condition started 1 day prior to admission when he developed numbness of the right leg and right shoulder. This improve and then was mainly localized to the right anterolateral chest area and flank. Persistence led to the patient to go to the ER. Also mentions issues with dizziness. In the ER blood pressure was 172/99. Laboratory work-up revealed increased WBC at 11.9, slightly increased hemoglobin A1c at 5.8, increased LDL at 131.6. CMP and troponin were unremarkable. Head CT without contrast did not reveal any acute process. Brain MRI revealed small left acute thalamic infarct. Carotid Doppler study revealed 0 to 49% bilateral ICA stenosis. When seen, patient reports resolution of his symptoms. Denies any weakness. No speech changes. Neurological examination revealed mainly decreased pinprick right shin and foot. Patient was started on aspirin 81 mg daily and atorvastatin 80 mg daily for stroke prophylaxis. Advised to monitor his blood pressures at home. Advised to establish care with a primary physician. Patient informed per [facility] regulation he can drive for 6 months. Patient was discharged 7/16/2021. Patient was then admitted again at [facility] 8/9/2021 for vertigo. Per patient he was walking at [facility] when he suddenly had an onset of dizziness and room spinning sensation associated with nausea, hot flash and profuse sweating. Denies any chest pains or palpitations. No loss of consciousness. No focal weakness or numbness, speech changes or visual disturbance. Right side of his neck just felt sore. EMS was called patient was brought to the ER. In the ER blood pressure was 199/108. NIH stroke scale was 0. CBC revealed increased WBC at 14.3, CMP revealing decreased sodium at 131, increased potassium at 6.7, increased glucose, decreased calcium, increased AST.   Urinalysis revealed possible UTI.  TSH, troponin, phosphorus and magnesium were unremarkable. Chest x-ray did not reveal any acute process. Head CT without contrast revealed no acute findings. Subtle chronic left thalamic lacunar infarct. Head and neck CTA did not reveal any flow-limiting stenosis or aneurysm. No ICA stenosis. When seen patient reports just mild lightheadedness but no other symptoms. Exam revealed right primary and and gaze nystagmus. Wide-based and slightly unsteady gait. Brain MRI without contrast did not reveal any acute stroke. Condition likely secondary to peripheral etiology of vertigo aggravated by severe hypertension. Echocardiogram with bubble study revealed EF of 55 to 60%. No shunting. Patient was discharged 8/11/2021. Since the last visit on 8/19/2021, patient was seen by cardiology 8/26/2021. Plan was to check event monitor to evaluate for atrial fibrillation. Also to do a CRISTINA and stress test.  Patient underwent a CRISTINA last 9/3/2021 and it was normal.  Patient underwent a nuclear cardiac SPECT stress test last 9/20/2021 and was reported to be normal.    Patient continues to report no recurrence of severe vertigo or hot feeling and profuse sweating. Blood pressures at home have been good. 130/80 or less. He did undergo physical therapy and finished his sessions last week. It did provide benefit. Mainly complains of just a little bit of uneasiness but no significant issues with gait instability. Patient was having problems with left-sided headache and was prescribed amitriptyline. He is taking 10 mg at bedtime which offered relief of his headaches. Also help him sleep through the night. He still has the sensation of tightness off of his right chest and flank area. No issues of numbness involving right face arm or leg. He is compliant with his aspirin 81 mg daily and atorvastatin 80 mg daily. Denies any side effects.       Outside reports reviewed: office notes, CRISTINA, stress test    Review of Systems:    A comprehensive review of systems was performed:   Constitutional: positive for none  Eyes: positive for vision problems  Ears, nose, mouth, throat, and face: positive for none  Respiratory: positive for none  Cardiovascular: positive for right sided chest pain, high blood pressure  Gastrointestinal: positive for constipation  Genitourinary: positive for none  Integument/breast: positive for none  Hematologic/lymphatic: positive for none  Musculoskeletal: positive for none  Neurological: positive for headaches  Behavioral/Psych: positive for none  Endocrine: positive for none  Allergic/Immunologic: positive for none      Objective:     Visit Vitals  /86   Pulse 68   Resp 20   SpO2 97%     PHYSICAL EXAM:    NEUROLOGICAL EXAM:    Appearance: The patient is obese, provides a coherent history and is in no acute distress. Mental Status: Oriented to time, place and person. Fluent, no aphasia or dysarthria. Mood and affect appropriate. Cranial Nerves:   Intact visual fields. RHETT, EOM's full, no nystagmus, no ptosis. Facial sensation is normal. Corneal reflexes are intact. Facial movement is symmetric. Hearing is normal bilaterally. Palate is midline with normal elevation. Sternocleidomastoid and trapezius muscles are normal. Tongue is midline. Motor:  5/5 strength in upper and lower proximal and distal muscles. Normal bulk and tone. No pronator drift. Reflexes:   Deep tendon reflexes 1+/4 and symmetrical. Downgoing toes. Sensory:   Normal to cold, pinprick and vibration. Gait:  Steady gait. No Romberg. Slight problems with tandem walking. Tremor:   No tremor noted. Cerebellar:  Intact FTN/ALEXEY/HTS. Anterior head posture with shoulders rotated in. Assessment:   Acute CVA  Gait instability  Cervicogenic headaches  Vertigo  Mixed hyperlipidemia  Hypertension    Plan:   Neurological examination does not reveal any residual right sensory deficit.   Patient does have subjective right trunk and flank sense of tightness and discomfort. Use of amitriptyline may help this as well. Exam reveals improvement of his gait. Status post left thalamic infarct. Head CT without contrast did not reveal any acute process. Brain MRI revealed small left acute thalamic infarct. Carotid Doppler study revealed 0 to 49% bilateral ICA stenosis. Echocardiogram done was unremarkable. LDL was elevated. Continue aspirin 81 mg daily for stroke prophylaxis. Continue exercises taught by physical therapy. Call 911 if new deficits occur. Per DMV regulation patient cannot drive for 6 months. Forms filled out for patient to return to work 10/19/2021. Improved. Continue exercises taught by physical therapy. Exam reveals elements of cervicogenic headaches due to poor anterior head posture. Patient was advised to correct his anterior head posture. Use headrest at work, at home and while driving. Continue amitriptyline 10 mg at bedtime. Okay to take OTC medication for relief. Sudden onset of vertigo associated with nausea, hot flash and profuse sweating is concerning for potential cardiac etiology. Patient underwent cardiac evaluation and advised to continue care with cardiology. LDL was previously elevated. Should be less than 70. Advised strict compliance with dietary modifications and medications for hyperlipidemia. Continue atorvastatin 80 mg daily. Hypertension is a major stroke risk for the patient. Continue home monitoring. Advised compliance with dietary modifications and medications for hypertension. All questions and concerns were answered. This note was created using voice recognition software. Despite editing, there may be syntax errors.

## 2021-09-23 NOTE — LETTER
9/26/2021    Patient: Mukesh Ritchie   YOB: 1960   Date of Visit: 9/23/2021     Graeme Moncada MD  [address] [place] [id number]  Via Fax: [phone]    Dear Graeme Moncada MD,      Thank you for referring Mr. Mukesh Ritchie to [facility] for evaluation. My notes for this consultation are attached. If you have questions, please do not hesitate to call me. I look forward to following your patient along with you.       Sincerely,    Pincus Pallas., MD

## 2021-10-06 ENCOUNTER — TELEPHONE (OUTPATIENT)
Dept: NEUROLOGY | Age: 61
End: 2021-10-06

## 2021-10-11 DIAGNOSIS — G44.86 CERVICOGENIC HEADACHE: ICD-10-CM

## 2021-10-11 RX ORDER — AMITRIPTYLINE HYDROCHLORIDE 10 MG/1
TABLET, FILM COATED ORAL
Qty: 90 TABLET | Refills: 0 | Status: SHIPPED | OUTPATIENT
Start: 2021-10-11 | End: 2021-11-08

## 2021-10-15 ENCOUNTER — OFFICE VISIT (OUTPATIENT)
Dept: CARDIOLOGY CLINIC | Age: 61
End: 2021-10-15
Payer: COMMERCIAL

## 2021-10-15 VITALS
OXYGEN SATURATION: 96 % | HEART RATE: 71 BPM | SYSTOLIC BLOOD PRESSURE: 128 MMHG | WEIGHT: 287 LBS | HEIGHT: 74 IN | BODY MASS INDEX: 36.83 KG/M2 | DIASTOLIC BLOOD PRESSURE: 80 MMHG

## 2021-10-15 DIAGNOSIS — G46.4 CEREBELLAR STROKE SYNDROME: ICD-10-CM

## 2021-10-15 DIAGNOSIS — I63.9 ACUTE CVA (CEREBROVASCULAR ACCIDENT) (HCC): Primary | ICD-10-CM

## 2021-10-15 DIAGNOSIS — R03.0 ELEVATED BLOOD PRESSURE READING: ICD-10-CM

## 2021-10-15 PROCEDURE — 99214 OFFICE O/P EST MOD 30 MIN: CPT | Performed by: SPECIALIST

## 2021-10-15 NOTE — LETTER
NOTIFICATION RETURN TO WORK    10/15/2021 11:37 AM    Mr. Toribio Savage  [address] [place] [address]      To Whom It May Concern:    Toribio Savage is currently under the care of [address] He will return to work on: Tue, 10/19/2021 with no restrictions. If there are questions or concerns please have the patient contact our office.         Sincerely,      Rodolfo Cedeno MD

## 2021-10-15 NOTE — PROGRESS NOTES
Jef Littlejohn is a 59 y.o. male    Visit Vitals  /80 (BP 1 Location: Left upper arm, BP Patient Position: Sitting, BP Cuff Size: Adult)   Pulse 71   Ht 6' 2\" (1.88 m)   Wt 287 lb (130.2 kg)   SpO2 96%   BMI 36.85 kg/m²       Chief Complaint   Patient presents with    Other     CVA    Dizziness       Chest pain NO  SOB NO  Dizziness NO  Swelling NO  Recent hospital visit NO  Refills NO  COVID VACCINE STATUS YES

## 2021-10-15 NOTE — PROGRESS NOTES
Roland Irene MD. [facility]              Patient: Stoney Chou  : 1960      Today's Date: 10/15/2021          HISTORY OF PRESENT ILLNESS:     History of Present Illness:    Doing well. Feels better overall. Essentially back to normal.  No CP or SOB. No dizziness. PAST MEDICAL HISTORY:     Past Medical History:   Diagnosis Date    CVA (cerebral vascular accident) ([address]     HTN (hypertension)     Hyperlipidemia     Vertigo            MEDICATIONS:     Current Outpatient Medications   Medication Sig Dispense Refill    amitriptyline (ELAVIL) 10 mg tablet TAKE ONE TABLET BY MOUTH EVERY NIGHT AT BEDTIME FOR 7 DAYS, THEN TAKE TWO TABLETS BY MOUTH EVERY NIGHT AT BEDTIME FOR 7 DAYS, THEN TAKE THREE TABLETS BY MOUTH EVERY NIGHT AT BEDTIME 90 Tablet 0    amLODIPine (NORVASC) 5 mg tablet Take 1 Tablet by mouth daily. 90 Tablet 3    metoprolol succinate (TOPROL-XL) 25 mg XL tablet Take 1 Tablet by mouth daily. 30 Tablet 12    aspirin (ASPIRIN) 325 mg tablet Take 325 mg by mouth daily.  atorvastatin (LIPITOR) 80 mg tablet Take 80 mg by mouth daily. No Known Allergies        SOCIAL HISTORY:     Social History     Tobacco Use    Smoking status: Never Smoker    Smokeless tobacco: Never Used   Vaping Use    Vaping Use: Never used   Substance Use Topics    Alcohol use: Yes     Comment: some    Drug use: Never         FAMILY HISTORY:     Family History   Problem Relation Age of Onset    Hypertension Father     Heart Disease Father     Ataxia Father              REVIEW OF SYMPTOMS:     Review of Symptoms:  Constitutional: Negative for fever, chills  HEENT: Negative for nosebleeds, tinnitus, and vision changes. Respiratory: Negative for cough, wheezing  Cardiovascular: Negative for orthopnea, claudication, syncope, and PND. Gastrointestinal: Negative for abdominal pain, diarrhea, melena. Genitourinary: Negative for dysuria  Musculoskeletal: Negative for myalgias.    Skin: Negative for rash  Heme: No problems bleeding. Neurological: Negative for speech change and focal weakness. CVA 7/21        PHYSICAL EXAM:     Physical Exam:  Visit Vitals  /80 (BP 1 Location: Left upper arm, BP Patient Position: Sitting, BP Cuff Size: Adult)   Pulse 71   Ht 6' 2\" (1.88 m)   Wt 287 lb (130.2 kg)   SpO2 96%   BMI 36.85 kg/m²     Patient appears generally well, mood and affect are appropriate and pleasant. HEENT:  Hearing intact, non-icteric, normocephalic, atraumatic. Neck Exam: Supple, No JVD or carotid bruits. Lung Exam: Clear to auscultation, even breath sounds. Cardiac Exam: Regular rate and rhythm with no murmur or rub  Abdomen: Soft, non-tender, normal bowel sounds. No bruits or masses. Extremities: Moves all ext well. No lower extremity edema. MSKTL: Overall good ROM ext  Skin: No significant rashes  Psych: Appropriate affect  Neuro - Grossly intact      LABS / OTHER STUDIES reviewed:     Lab Results   Component Value Date/Time    Sodium 136 08/10/2021 05:35 AM    Potassium 3.8 08/10/2021 05:35 AM    Chloride 103 08/10/2021 05:35 AM    CO2 28 08/10/2021 05:35 AM    Anion gap 5 08/10/2021 05:35 AM    Glucose 118 (H) 08/10/2021 05:35 AM    BUN 11 08/10/2021 05:35 AM    Creatinine 0.51 (L) 08/10/2021 05:35 AM    BUN/Creatinine ratio 22 (H) 08/10/2021 05:35 AM    GFR est AA >60 08/10/2021 05:35 AM    GFR est non-AA >60 08/10/2021 05:35 AM    Calcium 8.2 (L) 08/10/2021 05:35 AM    Bilirubin, total 1.0 08/09/2021 09:30 PM    Alk.  phosphatase 69 08/09/2021 09:30 PM    Protein, total 8.6 (H) 08/09/2021 09:30 PM    Albumin 3.6 08/09/2021 09:30 PM    Globulin 5.0 (H) 08/09/2021 09:30 PM    A-G Ratio 0.7 (L) 08/09/2021 09:30 PM    ALT (SGPT) 33 08/09/2021 09:30 PM    AST (SGOT) 64 (H) 08/09/2021 09:30 PM     Lab Results   Component Value Date/Time    WBC 11.0 08/11/2021 03:05 AM    HGB 13.2 08/11/2021 03:05 AM    HCT 41.0 08/11/2021 03:05 AM    PLATELET 854 36/48/9490 03:05 AM    MCV 85.4 08/11/2021 03:05 AM       Lab Results   Component Value Date/Time    Cholesterol, total 132 08/10/2021 05:35 AM    HDL Cholesterol 43 08/10/2021 05:35 AM    LDL, calculated 78.4 08/10/2021 05:35 AM    VLDL, calculated 10.6 08/10/2021 05:35 AM    Triglyceride 53 08/10/2021 05:35 AM    CHOL/HDL Ratio 3.1 08/10/2021 05:35 AM       Lab Results   Component Value Date/Time    TSH 0.45 08/10/2021 05:35 AM           CARDIAC DIAGNOSTICS:     Cardiac Evaluation Includes:  I reviewed the results below. Brain MRI 7/15/21 - Tiny, acute, left thalamic infarction. CTA Head / neck 8/21 - 1. No evidence of large vessel occlusion or hemodynamically significant carotid Stenosis. 2.  No intracranial aneurysm. 3.  No hemorrhage in the area of the prior left basal ganglia lacunar infarct. Echo 8/10/21 - TDS - LVEF 55-60%, negative bubble study - I viewed images myself     CRISTINA 9/3/21 - normal study     Exercise cardiolite 9/21/21 - walked 6:30 (7 METS), Normal stress EKG and MPI. LVEF 58%    Event Monitor 9/5/21-10/4/21 - NSR, Avg HR 70 bpm,  2 pauses, longest 4.7 sec,  No Afib    ---> Toprol XL cut to 25 mg after 4.7 sec pause found         EKG 7/16/21 - NSR, Possible Inferior infarct;  T wave abnormality, consider lateral ischemia  EKG 8/9/21 - NSR, NSST changes, prolonged QT      ASSESSMENT AND PLAN:     Assessment and Plan:    1) Acute CVA 7/21 with Vertigo 8/21  - cont statin and ASA  - CRISTINA and event monitor looked OK   - He is doing much better and back to baseline 10/15/21     2) Abnormal EKG / atypical CP / Risk factors / Diaphoresis   - Exercise cardiolite 9/21/21 - walked 6:30 (7 METS), Normal stress EKG and MPI. LVEF 58%  - he denies any cardiac complaints     3) HTN  - BP OK - continue meds     4) Dyslipidemia   - cont statin     5) NOEL - sleep study pending     6) See me in 1 year    [facility] [place]). Enjoys hunting, fishing. Lives with his wife.      Tamy Chaney MD, [address] [place] [address] [address]  [facility], [facility] [address]  [address], [employer] [address] [place] [address]  Ph: [phone]    [phone]

## 2021-11-08 DIAGNOSIS — G44.86 CERVICOGENIC HEADACHE: ICD-10-CM

## 2021-11-08 RX ORDER — AMITRIPTYLINE HYDROCHLORIDE 10 MG/1
10 TABLET, FILM COATED ORAL
Qty: 90 TABLET | Refills: 1 | Status: SHIPPED | OUTPATIENT
Start: 2021-11-08 | End: 2022-05-01

## 2022-01-13 NOTE — PROGRESS NOTES
Physical Therapy at [facility],   a part of [facility]  [address] [place] [address], [address]  Phone: [phone]  Fax: [phone]    Discharge Summary  2-15    Patient name: Julia Lagos  : 1960  Provider#: [id number]  Referral source: Ronnie Frye MD      Medical/Treatment Diagnosis: Other abnormalities of gait and mobility [R26.89]  Dizziness and giddiness [R42]     Prior Hospitalization: see medical history     Comorbidities: See Plan of Care  Prior Level of Function:See Plan of Care  Medications: Verified on Patient Summary List    Start of Care: 21      Onset Date:21   Visits from Start of Care: 3     Missed Visits: 0  Reporting Period : 21 to 9/15/21      ASSESSMENT/SUMMARY OF CARE: The patient responded well to PT with a focus on neuromuscular re-education exercises to address with dizziness and vestibular hypofunction. He achieved all long term goals and no longer requires PT services. Short Term Goals: To be accomplished in 4 weeks:  1. Patient will be able to demonstrate a sharpened Romberg test >10 seconds to allow for decreased fall risk. Met.  2. Patient will be able to perform >10 SLS B to allow for decreased fall risk. Met.  3. Patient will be able to ambulate 1/4 mile without a feeling of fatigue or dizziness. Met.  Long Term Goals: To be accomplished in 12 weeks:  1. Patient will be able to demonstrate a sharpened Romberg test >20 seconds to allow for decreased fall risk. Met.  2. Patient will be able to perform >20 SLS B to allow for decreased fall risk. Met.  3. Patient will be able to ambulate 1/2 mile without a feeling of fatigue or dizziness. Met.    RECOMMENDATIONS:  [x]Discontinue therapy: [x]Patient has reached or is progressing toward set goals      []Patient is non-compliant or has abdicated      []Due to lack of appreciable progress towards set goals      []Other    Cameron Luciano, PT 2022

## 2022-03-18 PROBLEM — Z86.73 CEREBROVASCULAR ACCIDENT, OLD: Status: ACTIVE | Noted: 2021-08-10

## 2022-03-18 PROBLEM — I63.9 ACUTE CVA (CEREBROVASCULAR ACCIDENT) (HCC): Status: ACTIVE | Noted: 2021-07-15

## 2022-03-19 PROBLEM — R42 ACUTE SEVERE VERTIGO: Status: ACTIVE | Noted: 2021-08-10

## 2022-03-19 PROBLEM — D72.829 LEUKOCYTOSIS: Status: ACTIVE | Noted: 2021-08-10

## 2022-03-19 PROBLEM — G46.4 CEREBELLAR STROKE SYNDROME: Status: ACTIVE | Noted: 2021-08-10

## 2022-03-19 PROBLEM — R03.0 ELEVATED BLOOD PRESSURE READING: Status: ACTIVE | Noted: 2021-07-16

## 2022-05-01 DIAGNOSIS — G44.86 CERVICOGENIC HEADACHE: ICD-10-CM

## 2022-05-01 RX ORDER — AMITRIPTYLINE HYDROCHLORIDE 10 MG/1
TABLET, FILM COATED ORAL
Qty: 90 TABLET | Refills: 1 | Status: SHIPPED | OUTPATIENT
Start: 2022-05-01 | End: 2022-08-16 | Stop reason: SDUPTHER

## 2022-08-16 ENCOUNTER — OFFICE VISIT (OUTPATIENT)
Dept: NEUROLOGY | Age: 62
End: 2022-08-16
Payer: COMMERCIAL

## 2022-08-16 VITALS
HEART RATE: 76 BPM | RESPIRATION RATE: 18 BRPM | BODY MASS INDEX: 38.89 KG/M2 | DIASTOLIC BLOOD PRESSURE: 80 MMHG | SYSTOLIC BLOOD PRESSURE: 138 MMHG | WEIGHT: 303 LBS | HEIGHT: 74 IN | OXYGEN SATURATION: 95 %

## 2022-08-16 DIAGNOSIS — E78.2 MIXED HYPERLIPIDEMIA: ICD-10-CM

## 2022-08-16 DIAGNOSIS — M54.81 OCCIPITAL NEURALGIA OF LEFT SIDE: ICD-10-CM

## 2022-08-16 DIAGNOSIS — Z86.73 HISTORY OF CVA (CEREBROVASCULAR ACCIDENT): ICD-10-CM

## 2022-08-16 DIAGNOSIS — G44.86 CERVICOGENIC HEADACHE: Primary | ICD-10-CM

## 2022-08-16 DIAGNOSIS — I10 ESSENTIAL HYPERTENSION: ICD-10-CM

## 2022-08-16 PROCEDURE — 99214 OFFICE O/P EST MOD 30 MIN: CPT | Performed by: PSYCHIATRY & NEUROLOGY

## 2022-08-16 RX ORDER — AMITRIPTYLINE HYDROCHLORIDE 10 MG/1
30 TABLET, FILM COATED ORAL
Qty: 90 TABLET | Refills: 3 | Status: SHIPPED | OUTPATIENT
Start: 2022-08-16 | End: 2022-10-28 | Stop reason: SDUPTHER

## 2022-08-16 NOTE — LETTER
8/16/2022    Patient: Yashira Beltran   YOB: 1960   Date of Visit: 8/16/2022     Doug Sawyer MD  [address] [place] [phone]  Via Fax: [phone]    Dear Doug Sawyer MD,      Thank you for referring Mr. Yashira Beltran to [facility] for evaluation. My notes for this consultation are attached. If you have questions, please do not hesitate to call me. I look forward to following your patient along with you.       Sincerely,    Beatriz Almonte MD

## 2022-08-16 NOTE — PROGRESS NOTES
NEUROLOGY CLINIC NOTE    Patient ID:  Arely Berg  [id number]  64 y.o.  1960    Date of Visit:  August 16, 2022    Reason for Visit:  Stroke, headache    Chief Complaint   Patient presents with    Stroke     Follow up and feeling stable    Headache     Patient states he has bee getting headaches  2-3 times a week in the last 2-3 months. Patient states he would take his hands and press against his head which help relieve the headache. Patient states he is more sensitive to lights and noise. 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 Prior to Admission medications    Medication Sig Start Date End Date Taking? Authorizing Provider   TADALAFIL PO 5 mg nightly. 7/13/22  Yes Provider, Historical   amitriptyline (ELAVIL) 10 mg tablet TAKE ONE TABLET BY MOUTH ONCE NIGHTLY  Patient taking differently: 20 mg. 5/1/22  Yes Jorge Alberto Christie MD   amLODIPine (NORVASC) 5 mg tablet Take 1 Tablet by mouth daily. 9/13/21  Yes Tereso Medel MD   metoprolol succinate (TOPROL-XL) 25 mg XL tablet Take 1 Tablet by mouth daily. 9/13/21  Yes Tereso Medel MD   aspirin (ASPIRIN) 325 mg tablet Take 325 mg by mouth daily. Yes Provider, Historical   atorvastatin (LIPITOR) 80 mg tablet Take 80 mg by mouth daily.    Yes Provider, Historical     No Known Allergies   Social History     Tobacco Use    Smoking status: Never    Smokeless tobacco: Never   Substance Use Topics    Alcohol use: Yes     Comment: some      Family History   Problem Relation Age of Onset Hypertension Father     Heart Disease Father     Ataxia Father          Subjective:      Maggi Salazar is a 57 y.o. RHWM with history of CVA, hypertension, hyperlipidemia and vertigo was here for further evaluation after a recent stroke. Patient is here for follow up. Patient was admitted at [facility] last 7/15/2021 for numbness in the right shoulder and leg. Per patient condition started 1 day prior to admission when he developed numbness of the right leg and right shoulder. This improve and then was mainly localized to the right anterolateral chest area and flank. Persistence led to the patient to go to the ER. Also mentions issues with dizziness. In the ER blood pressure was 172/99. Laboratory work-up revealed increased WBC at 11.9, slightly increased hemoglobin A1c at 5.8, increased LDL at 131.6. CMP and troponin were unremarkable. Head CT without contrast did not reveal any acute process. Brain MRI revealed small left acute thalamic infarct. Carotid Doppler study revealed 0 to 49% bilateral ICA stenosis. When seen, patient reports resolution of his symptoms. Denies any weakness. No speech changes. Neurological examination revealed mainly decreased pinprick right shin and foot. Patient was started on aspirin 81 mg daily and atorvastatin 80 mg daily for stroke prophylaxis. Advised to monitor his blood pressures at home. Advised to establish care with a primary physician. Patient informed per [facility] regulation he can drive for 6 months. Patient was discharged 7/16/2021. Patient was then admitted again at [facility] 8/9/2021 for vertigo. Per patient he was walking at [employer] when he suddenly had an onset of dizziness and room spinning sensation associated with nausea, hot flash and profuse sweating. Denies any chest pains or palpitations. No loss of consciousness. No focal weakness or numbness, speech changes or visual disturbance.   Right side of his neck just felt sore. EMS was called patient was brought to the ER. In the ER blood pressure was 199/108. NIH stroke scale was 0. CBC revealed increased WBC at 14.3, CMP revealing decreased sodium at 131, increased potassium at 6.7, increased glucose, decreased calcium, increased AST. Urinalysis revealed possible UTI.  TSH, troponin, phosphorus and magnesium were unremarkable. Chest x-ray did not reveal any acute process. Head CT without contrast revealed no acute findings. Subtle chronic left thalamic lacunar infarct. Head and neck CTA did not reveal any flow-limiting stenosis or aneurysm. No ICA stenosis. When seen patient reports just mild lightheadedness but no other symptoms. Exam revealed right primary and and gaze nystagmus. Wide-based and slightly unsteady gait. Brain MRI without contrast did not reveal any acute stroke. Condition likely secondary to peripheral etiology of vertigo aggravated by severe hypertension. Echocardiogram with bubble study revealed EF of 55 to 60%. No shunting. Patient was discharged 8/11/2021. Patient was seen by cardiology 8/26/2021. Plan was to check event monitor to evaluate for atrial fibrillation. Also to do a CRISTINA and stress test.  Patient underwent a CRISTINA last 9/3/2021 and it was normal.  Patient underwent a nuclear cardiac SPECT stress test last 9/20/2021 and was reported to be normal.    Since last visit on 9/23/2021, patient continues to report no recurrence of severe vertigo or hot feeling and profuse sweating. Blood pressures at home have been good. He is compliant with his aspirin 81 mg daily and atorvastatin 80 mg daily. Denies any side effects. Recent patient is here is because of the increased frequency of his headaches.   Sudden onset  Left temple   Hurting or throbbing pain  At its worse 3 to 4/10  Light and noise sensitivity  Hot to the touch  5 to 10 minutes  Occurs 2 to 3 times a week  Amitriptyline 10 mg at bedtime and helps with sleep and no side effects. Two at night if with headaches which offers benefit. Outside reports reviewed: none    Review of Systems:    A comprehensive review of systems was performed:   Constitutional: positive for none  Eyes: positive for vision problems  Ears, nose, mouth, throat, and face: positive for none  Respiratory: positive for none  Cardiovascular: positive for right sided chest pain, high blood pressure  Gastrointestinal: positive for constipation  Genitourinary: positive for none  Integument/breast: positive for none  Hematologic/lymphatic: positive for none  Musculoskeletal: positive for none  Neurological: positive for headaches  Behavioral/Psych: positive for none  Endocrine: positive for none  Allergic/Immunologic: positive for none      Objective:     Visit Vitals  /80   Pulse 76   Resp 18   Ht 6' 2\" (1.88 m)   Wt 137.4 kg (303 lb)   SpO2 95%   BMI 38.90 kg/m²     PHYSICAL EXAM:    NEUROLOGICAL EXAM:    Appearance: The patient is obese, provides a coherent history and is in no acute distress. Mental Status: Oriented to time, place and person. Fluent, no aphasia or dysarthria. Mood and affect appropriate. Cranial Nerves:   Intact visual fields. RHETT, EOM's full, no nystagmus, no ptosis. Facial sensation is normal. Corneal reflexes are intact. Facial movement is symmetric. Hearing is normal bilaterally. Palate is midline with normal elevation. Sternocleidomastoid and trapezius muscles are normal. Tongue is midline. Motor:  5/5 strength in upper and lower proximal and distal muscles. Normal bulk and tone. No pronator drift. Reflexes:   Deep tendon reflexes 1+/4 and symmetrical. Downgoing toes. Sensory:   Normal to cold, pinprick and vibration. Gait:  Steady gait. No Romberg. Slight problems with tandem walking. Tremor:   No tremor noted. Cerebellar:  Intact FTN/ALEXEY/HTS.      Anterior head posture with shoulders rotated in. (+) tenderness on palpation bilateral occiput    Assessment: Cervicogenic headaches  Left occipital neuralgia  History of CVA  Mixed hyperlipidemia  Hypertension    Plan:   History and exam reveals elements of cervicogenic headaches due to poor anterior head posture. Patient was advised to correct his anterior head posture. Use headrest at work, at home and while driving. Patient given brochure for neck and shoulder exercises to do. Potential referral to physical therapy if condition persists or worsens. Trial of increased dose of amitriptyline up to 30 mg at bedtime if necessary. Prescription was provided. Okay to take OTC medication for relief. History and exam also reveals elements of occipital neuralgia. Trial of increased dose of amitriptyline as above. Posture changes as above. Neck and shoulder exercises as above. Condition persists or worsens patient may benefit from occipital nerve blocks. Neurological examination does not reveal any residual right sensory deficit. Status post left thalamic infarct. Head CT without contrast did not reveal any acute process. Brain MRI revealed small left acute thalamic infarct. Carotid Doppler study revealed 0 to 49% bilateral ICA stenosis. Echocardiogram done was unremarkable. LDL was elevated. Continue aspirin 81 mg daily for stroke prophylaxis. Continue exercises taught by physical therapy. Call 911 if new deficits occur. LDL was previously elevated. Should be less than 70. Advised strict compliance with dietary modifications and medications for hyperlipidemia. Continue atorvastatin 80 mg daily. Hypertension is a major stroke risk for the patient. Continue home monitoring. Advised compliance with dietary modifications and medications for hypertension. All questions and concerns were answered. This note was created using voice recognition software. Despite editing, there may be syntax errors.

## 2022-08-16 NOTE — PROGRESS NOTES
Chief Complaint   Patient presents with    Stroke     Follow up and feeling stable    Headache     Patient states he has bee getting headaches  2-3 times a week in the last 2-3 months. Patient states he would take his hands and press against his head which help relieve the headache. Patient states he is more sensitive to lights and noise.          Visit Vitals  /80   Pulse 76   Resp 18   Ht 6' 2\" (1.88 m)   Wt 137.4 kg (303 lb)   SpO2 95%   BMI 38.90 kg/m²

## 2022-08-31 RX ORDER — AMLODIPINE BESYLATE 5 MG/1
TABLET ORAL
Qty: 90 TABLET | Refills: 1 | Status: SHIPPED | OUTPATIENT
Start: 2022-08-31

## 2022-08-31 NOTE — TELEPHONE ENCOUNTER
Refill per VO of Dr. Radha Plaza  Last appt: 10/15/2021  Future Appointments   Date Time Provider Noe Britt   10/21/2022  8:20 AM Sunshine Amado MD [facility] BS AMB   11/15/2022  8:00 AM Terell Ramírez MD [facility] BS AMB       Requested Prescriptions     Signed Prescriptions Disp Refills    amLODIPine (NORVASC) 5 mg tablet 90 Tablet 1     Sig: TAKE ONE TABLET BY MOUTH DAILY     Authorizing Provider: Mar Hatch     Ordering User: Liza Street

## 2022-09-28 RX ORDER — METOPROLOL SUCCINATE 25 MG/1
TABLET, EXTENDED RELEASE ORAL
Qty: 90 TABLET | Refills: 0 | Status: SHIPPED | OUTPATIENT
Start: 2022-09-28

## 2022-10-28 DIAGNOSIS — M54.81 OCCIPITAL NEURALGIA OF LEFT SIDE: ICD-10-CM

## 2022-10-28 DIAGNOSIS — G44.86 CERVICOGENIC HEADACHE: ICD-10-CM

## 2022-10-28 RX ORDER — AMITRIPTYLINE HYDROCHLORIDE 10 MG/1
30 TABLET, FILM COATED ORAL
Qty: 90 TABLET | Refills: 3 | Status: SHIPPED | OUTPATIENT
Start: 2022-10-28

## 2022-11-15 ENCOUNTER — OFFICE VISIT (OUTPATIENT)
Dept: NEUROLOGY | Age: 62
End: 2022-11-15
Payer: COMMERCIAL

## 2022-11-15 VITALS
OXYGEN SATURATION: 96 % | RESPIRATION RATE: 18 BRPM | HEIGHT: 74 IN | HEART RATE: 84 BPM | DIASTOLIC BLOOD PRESSURE: 84 MMHG | BODY MASS INDEX: 35.94 KG/M2 | WEIGHT: 280 LBS | SYSTOLIC BLOOD PRESSURE: 136 MMHG

## 2022-11-15 DIAGNOSIS — M54.81 OCCIPITAL NEURALGIA OF LEFT SIDE: ICD-10-CM

## 2022-11-15 DIAGNOSIS — E78.2 MIXED HYPERLIPIDEMIA: ICD-10-CM

## 2022-11-15 DIAGNOSIS — G44.86 CERVICOGENIC HEADACHE: Primary | ICD-10-CM

## 2022-11-15 DIAGNOSIS — Z86.73 HISTORY OF CVA (CEREBROVASCULAR ACCIDENT): ICD-10-CM

## 2022-11-15 DIAGNOSIS — I10 ESSENTIAL HYPERTENSION: ICD-10-CM

## 2022-11-15 PROCEDURE — 3078F DIAST BP <80 MM HG: CPT | Performed by: PSYCHIATRY & NEUROLOGY

## 2022-11-15 PROCEDURE — 99214 OFFICE O/P EST MOD 30 MIN: CPT | Performed by: PSYCHIATRY & NEUROLOGY

## 2022-11-15 PROCEDURE — 3074F SYST BP LT 130 MM HG: CPT | Performed by: PSYCHIATRY & NEUROLOGY

## 2022-11-15 NOTE — LETTER
11/15/2022    Patient: Anil Castaneda   YOB: 1960   Date of Visit: 11/15/2022     Cielo Womack MD  [address] [place] [id number]  Via Fax: [phone]    Dear Cielo Womack MD,      Thank you for referring Mr. Anil Castaneda to [facility] for evaluation. My notes for this consultation are attached. If you have questions, please do not hesitate to call me. I look forward to following your patient along with you.       Sincerely,    Nava Zacarias MD

## 2022-11-15 NOTE — PROGRESS NOTES
NEUROLOGY CLINIC NOTE    Patient ID:  Bryan Candelaria  [id number]  49 y.o.  1960    Date of Visit:  November 15, 2022    Reason for Visit:  Stroke, headache    Chief Complaint   Patient presents with    Headache     3 month follow up. Patient reports headaches have gotten better and has about 1-2 headaches a week. Neck Pain     Patient reports the neck pain is much better. Stroke     Patient states he still has a little weakness \"all over\" but overall doing better. 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 Prior to Admission medications    Medication Sig Start Date End Date Taking? Authorizing Provider   amitriptyline (ELAVIL) 10 mg tablet Take 3 Tablets by mouth nightly. Patient taking differently: Take 10 mg by mouth nightly. 10/28/22  Yes Monica Patel MD   metoprolol succinate (TOPROL-XL) 25 mg XL tablet TAKE ONE TABLET BY MOUTH DAILY 9/28/22  Yes Emma Power MD   amLODIPine (NORVASC) 5 mg tablet TAKE ONE TABLET BY MOUTH DAILY 8/31/22  Yes Emma Power MD   TADALAFIL PO 5 mg nightly. 7/13/22  Yes Provider, Historical   aspirin (ASPIRIN) 325 mg tablet Take 325 mg by mouth daily. Yes Provider, Historical   atorvastatin (LIPITOR) 80 mg tablet Take 80 mg by mouth daily.    Yes Provider, Historical     No Known Allergies   Social History     Tobacco Use    Smoking status: Never    Smokeless tobacco: Never   Substance Use Topics    Alcohol use: Yes     Comment: some      Family History   Problem Relation Age of Onset Hypertension Father     Heart Disease Father     Ataxia Father          Subjective:      Brian Quinteros is a 57 y.o. RHWM with history of CVA, hypertension, hyperlipidemia and vertigo was here for further evaluation after a recent stroke. Patient is here for follow up. Patient was admitted at [facility] last 7/15/2021 for numbness in the right shoulder and leg. Per patient condition started 1 day prior to admission when he developed numbness of the right leg and right shoulder. This improve and then was mainly localized to the right anterolateral chest area and flank. Persistence led to the patient to go to the ER. Also mentions issues with dizziness. In the ER blood pressure was 172/99. Laboratory work-up revealed increased WBC at 11.9, slightly increased hemoglobin A1c at 5.8, increased LDL at 131.6. CMP and troponin were unremarkable. Head CT without contrast did not reveal any acute process. Brain MRI revealed small left acute thalamic infarct. Carotid Doppler study revealed 0 to 49% bilateral ICA stenosis. When seen, patient reports resolution of his symptoms. Denies any weakness. No speech changes. Neurological examination revealed mainly decreased pinprick right shin and foot. Patient was started on aspirin 81 mg daily and atorvastatin 80 mg daily for stroke prophylaxis. Advised to monitor his blood pressures at home. Advised to establish care with a primary physician. Patient informed per [facility] regulation he can drive for 6 months. Patient was discharged 7/16/2021. Patient was then admitted again at [facility] 8/9/2021 for vertigo. Per patient he was walking at [employer] when he suddenly had an onset of dizziness and room spinning sensation associated with nausea, hot flash and profuse sweating. Denies any chest pains or palpitations. No loss of consciousness. No focal weakness or numbness, speech changes or visual disturbance.   Right side of his neck just felt sore. EMS was called patient was brought to the ER. In the ER blood pressure was 199/108. NIH stroke scale was 0. CBC revealed increased WBC at 14.3, CMP revealing decreased sodium at 131, increased potassium at 6.7, increased glucose, decreased calcium, increased AST. Urinalysis revealed possible UTI.  TSH, troponin, phosphorus and magnesium were unremarkable. Chest x-ray did not reveal any acute process. Head CT without contrast revealed no acute findings. Subtle chronic left thalamic lacunar infarct. Head and neck CTA did not reveal any flow-limiting stenosis or aneurysm. No ICA stenosis. When seen patient reports just mild lightheadedness but no other symptoms. Exam revealed right primary and and gaze nystagmus. Wide-based and slightly unsteady gait. Brain MRI without contrast did not reveal any acute stroke. Condition likely secondary to peripheral etiology of vertigo aggravated by severe hypertension. Echocardiogram with bubble study revealed EF of 55 to 60%. No shunting. Patient was discharged 8/11/2021. Patient was seen by cardiology 8/26/2021. Plan was to check event monitor to evaluate for atrial fibrillation. Also to do a CRISTINA and stress test.  Patient underwent a CRISTINA last 9/3/2021 and it was normal.  Patient underwent a nuclear cardiac SPECT stress test last 9/20/2021 and was reported to be normal.    Since last visit on 8/16/2022, patient continues to report no recurrence of severe vertigo or hot feeling and profuse sweating. Blood pressures at home have been good. He is compliant with his aspirin 81 mg daily and atorvastatin 80 mg daily. Denies any side effects. Only notes some sense of tightness right rib cage area. Right side feels weak but no obvious when he uses the arm and leg. Patient is here is because of the increased frequency of his headaches.   Sudden onset  Left temple   Hurting or throbbing pain  At its worse 3 to 4/10  Light and noise sensitivity  Hot to the touch  5 to 10 minutes  Occurs 2 to 3 times a week  Amitriptyline 10 mg at bedtime and helps with sleep and no side effects. Two at night if with headaches which offers benefit. Patient reports improvement of his headaches. They are down to 1-2 times a week. Not as intense. He is aware of his posture and corrects it. He is doing the neck and shoulder exercises. This has helped significantly his neck issues. He is happy with his progress. Outside reports reviewed: none    Review of Systems:    A comprehensive review of systems was performed:   Constitutional: positive for none  Eyes: positive for vision problems  Ears, nose, mouth, throat, and face: positive for none  Respiratory: positive for none  Cardiovascular: positive for right sided chest pain, high blood pressure  Gastrointestinal: positive for constipation  Genitourinary: positive for none  Integument/breast: positive for none  Hematologic/lymphatic: positive for none  Musculoskeletal: positive for none  Neurological: positive for headaches  Behavioral/Psych: positive for none  Endocrine: positive for none  Allergic/Immunologic: positive for none      Objective:     Visit Vitals  /84   Pulse 84   Resp 18   Ht 6' 2\" (1.88 m)   Wt 127 kg (280 lb)   SpO2 96%   BMI 35.95 kg/m²     PHYSICAL EXAM:    NEUROLOGICAL EXAM:    Appearance: The patient is obese, provides a coherent history and is in no acute distress. Mental Status: Oriented to time, place and person. Fluent, no aphasia or dysarthria. Mood and affect appropriate. Cranial Nerves:   Intact visual fields. RHETT, EOM's full, no nystagmus, no ptosis. Facial sensation is normal. Corneal reflexes are intact. Facial movement is symmetric. Hearing is normal bilaterally. Palate is midline with normal elevation. Sternocleidomastoid and trapezius muscles are normal. Tongue is midline. Motor:  5/5 strength in upper and lower proximal and distal muscles. Normal bulk and tone.  No pronator drift. Reflexes:   Deep tendon reflexes 1+/4 and symmetrical. Downgoing toes. Sensory:   Normal to cold, pinprick and vibration. Gait:  Steady gait. No Romberg. Slight problems with tandem walking. Tremor:   No tremor noted. Cerebellar:  Intact FTN/ALEXEY/HTS. Anterior head posture with shoulders rotated in    Assessment:   Cervicogenic headaches  Left occipital neuralgia  History of CVA  Mixed hyperlipidemia  Hypertension    Plan:   History and exam reveals elements of cervicogenic headaches due to poor anterior head posture. Patient was encouraged to continue to correct his anterior head posture. Use headrest at work, at home and while driving. Continue neck and shoulder exercises. Potential referral to physical therapy if condition persists or worsens. Responding to Amitriptyline. Continue Amitriptyline up to 30 mg at bedtime if necessary. Okay to take OTC medication for relief. History and exam also reveals elements of occipital neuralgia. Continue amitriptyline as above. Posture changes as above. Neck and shoulder exercises as above. Condition persists or worsens patient may benefit from occipital nerve blocks. Neurological examination does not reveal any residual right sensory deficit. Status post left thalamic infarct. Head CT without contrast did not reveal any acute process. Brain MRI revealed small left acute thalamic infarct. Carotid Doppler study revealed 0 to 49% bilateral ICA stenosis. Echocardiogram done was unremarkable. LDL was elevated. Continue aspirin 81 mg daily for stroke prophylaxis. Continue exercises taught by physical therapy. Call 911 if new deficits occur. LDL was previously elevated. Should be less than 70. Advised strict compliance with dietary modifications and medications for hyperlipidemia. Continue atorvastatin 80 mg daily. Recommend PCP rechecking lipid panel and adjust medication accordingly.      Hypertension is a major stroke risk for the patient. Continue home monitoring. Advised compliance with dietary modifications and medications for hypertension. All questions and concerns were answered. This note was created using voice recognition software. Despite editing, there may be syntax errors.

## 2022-11-15 NOTE — PROGRESS NOTES
Chief Complaint   Patient presents with    Headache     3 month follow up. Patient reports headaches have gotten better and has about 1-2 headaches a week. Neck Pain     Patient reports the neck pain is much better. Stroke     Patient states he still has a little weakness \"all over\" but overall doing better.        Visit Vitals  /84   Pulse 84   Resp 18   Ht 6' 2\" (1.88 m)   Wt 127 kg (280 lb)   SpO2 96%   BMI 35.95 kg/m²

## 2022-12-09 ENCOUNTER — OFFICE VISIT (OUTPATIENT)
Dept: CARDIOLOGY CLINIC | Age: 62
End: 2022-12-09
Payer: COMMERCIAL

## 2022-12-09 VITALS
BODY MASS INDEX: 37.86 KG/M2 | DIASTOLIC BLOOD PRESSURE: 84 MMHG | HEIGHT: 74 IN | SYSTOLIC BLOOD PRESSURE: 136 MMHG | WEIGHT: 295 LBS | HEART RATE: 71 BPM

## 2022-12-09 DIAGNOSIS — I63.9 ACUTE CVA (CEREBROVASCULAR ACCIDENT) (HCC): Primary | ICD-10-CM

## 2022-12-09 DIAGNOSIS — I10 HYPERTENSION, UNSPECIFIED TYPE: ICD-10-CM

## 2022-12-09 DIAGNOSIS — G47.33 OSA (OBSTRUCTIVE SLEEP APNEA): ICD-10-CM

## 2022-12-09 RX ORDER — ROSUVASTATIN CALCIUM 10 MG/1
10 TABLET, COATED ORAL
COMMUNITY

## 2022-12-09 NOTE — PROGRESS NOTES
Room # 6    States he is taking generic crestor in place of Lipitor. Changed to increase HDL. Not able to verify through dispense report    Peyton Velasquez is a 57 y.o. male    Chief Complaint   Patient presents with    Follow-up    Hypertension    Other     CVA       Vitals:    12/09/22 1122   BP: 136/84   BP 1 Location: Left upper arm   BP Patient Position: Sitting   BP Cuff Size: Large adult   Pulse: 71   Height: 6' 2\" (1.88 m)   Weight: 295 lb (133.8 kg)       Chest pain No    SOB No    Dizziness NO    Swelling NO    Refills NO      1. Have you been to the ER, urgent care clinic since your last visit? Hospitalized since your last visit? No    2. Have you seen or consulted any other health care providers outside of the [address] [place] [address] since your last visit? Include any pap smears or colon screening.  No

## 2022-12-09 NOTE — PROGRESS NOTES
Chief Complaint   Patient presents with    Follow-up    Hypertension    Other     CVA     Vitals:    12/09/22 1122   Height: 6' 2\" (1.88 m)       Chest pain denied     SOB denied     Palpitations denied     Swelling in hands/feet denied     Dizziness denied     Recent hospital stays denied     Refills denied

## 2022-12-09 NOTE — PROGRESS NOTES
Bhupinder Fuentes MD. [facility]              Patient: Ruperto Smith  : 1960      Today's Date: 2022          HISTORY OF PRESENT ILLNESS:     History of Present Illness:    Doing well. No deficits from stroke. No CP or SOB. No dizziness. PAST MEDICAL HISTORY:     Past Medical History:   Diagnosis Date    CVA (cerebral vascular accident) ([address]     HTN (hypertension)     Hyperlipidemia     Vertigo            MEDICATIONS:     Current Outpatient Medications   Medication Sig Dispense Refill    docosahexaenoic acid/epa (FISH OIL PO) Take  by mouth. OTHER MSN for joint support      rosuvastatin (Crestor) 10 mg tablet Take 10 mg by mouth nightly. Dose is unknown      amitriptyline (ELAVIL) 10 mg tablet Take 3 Tablets by mouth nightly. (Patient taking differently: Take 10 mg by mouth nightly.) 90 Tablet 3    metoprolol succinate (TOPROL-XL) 25 mg XL tablet TAKE ONE TABLET BY MOUTH DAILY 90 Tablet 0    amLODIPine (NORVASC) 5 mg tablet TAKE ONE TABLET BY MOUTH DAILY 90 Tablet 1    TADALAFIL PO 5 mg nightly. aspirin (ASPIRIN) 325 mg tablet Take 325 mg by mouth daily. No Known Allergies        SOCIAL HISTORY:     Social History     Tobacco Use    Smoking status: Never    Smokeless tobacco: Never   Vaping Use    Vaping Use: Never used   Substance Use Topics    Alcohol use: Yes     Comment: some    Drug use: Never         FAMILY HISTORY:     Family History   Problem Relation Age of Onset    Hypertension Father     Heart Disease Father     Ataxia Father              REVIEW OF SYMPTOMS:     Review of Symptoms:  Constitutional: Negative for fever, chills  HEENT: Negative for nosebleeds, tinnitus, and vision changes. Respiratory: Negative for cough, wheezing  Cardiovascular: Negative for orthopnea, claudication, syncope, and PND. Gastrointestinal: Negative for abdominal pain, diarrhea, melena. Genitourinary: Negative for dysuria  Musculoskeletal: Negative for myalgias.    Skin: Negative for rash  Heme: No problems bleeding. Neurological: Negative for speech change and focal weakness. CVA 7/21        PHYSICAL EXAM:     Physical Exam:  Visit Vitals  /84 (BP 1 Location: Left upper arm, BP Patient Position: Sitting, BP Cuff Size: Large adult)   Pulse 71   Ht 6' 2\" (1.88 m)   Wt 295 lb (133.8 kg)   BMI 37.88 kg/m²     Patient appears generally well, mood and affect are appropriate and pleasant. HEENT:  Hearing intact, non-icteric, normocephalic, atraumatic. Neck Exam: Supple, No JVD or carotid bruits. Lung Exam: Clear to auscultation, even breath sounds. Cardiac Exam: Regular rate and rhythm with no murmur or rub  Abdomen: Soft, non-tender, normal bowel sounds. No bruits or masses. Extremities: Moves all ext well. No lower extremity edema. MSKTL: Overall good ROM ext  Skin: No significant rashes  Psych: Appropriate affect  Neuro - Grossly intact      LABS / OTHER STUDIES reviewed:     Lab Results   Component Value Date/Time    Sodium 136 08/10/2021 05:35 AM    Potassium 3.8 08/10/2021 05:35 AM    Chloride 103 08/10/2021 05:35 AM    CO2 28 08/10/2021 05:35 AM    Anion gap 5 08/10/2021 05:35 AM    Glucose 118 (H) 08/10/2021 05:35 AM    BUN 11 08/10/2021 05:35 AM    Creatinine 0.51 (L) 08/10/2021 05:35 AM    BUN/Creatinine ratio 22 (H) 08/10/2021 05:35 AM    GFR est AA >60 08/10/2021 05:35 AM    GFR est non-AA >60 08/10/2021 05:35 AM    Calcium 8.2 (L) 08/10/2021 05:35 AM    Bilirubin, total 1.0 08/09/2021 09:30 PM    Alk.  phosphatase 69 08/09/2021 09:30 PM    Protein, total 8.6 (H) 08/09/2021 09:30 PM    Albumin 3.6 08/09/2021 09:30 PM    Globulin 5.0 (H) 08/09/2021 09:30 PM    A-G Ratio 0.7 (L) 08/09/2021 09:30 PM    ALT (SGPT) 33 08/09/2021 09:30 PM    AST (SGOT) 64 (H) 08/09/2021 09:30 PM     Lab Results   Component Value Date/Time    WBC 11.0 08/11/2021 03:05 AM    HGB 13.2 08/11/2021 03:05 AM    HCT 41.0 08/11/2021 03:05 AM    PLATELET 140 90/62/6649 03:05 AM    MCV 85.4 08/11/2021 03:05 AM       Lab Results   Component Value Date/Time    Cholesterol, total 132 08/10/2021 05:35 AM    HDL Cholesterol 43 08/10/2021 05:35 AM    LDL, calculated 78.4 08/10/2021 05:35 AM    VLDL, calculated 10.6 08/10/2021 05:35 AM    Triglyceride 53 08/10/2021 05:35 AM    CHOL/HDL Ratio 3.1 08/10/2021 05:35 AM       Lab Results   Component Value Date/Time    TSH 0.45 08/10/2021 05:35 AM       Labs 4/22 - CBC and CMP OK, chol 140, HDL 37, TG, 8, LDL 86       CARDIAC DIAGNOSTICS:     Cardiac Evaluation Includes:  I reviewed the results below. Brain MRI 7/15/21 - Tiny, acute, left thalamic infarction. CTA Head / neck 8/21 - 1. No evidence of large vessel occlusion or hemodynamically significant carotid Stenosis. 2.  No intracranial aneurysm. 3.  No hemorrhage in the area of the prior left basal ganglia lacunar infarct. Echo 8/10/21 - TDS - LVEF 55-60%, negative bubble study - I viewed images myself     CRISTINA 9/3/21 - normal study     Exercise cardiolite 9/21/21 - walked 6:30 (7 METS), Normal stress EKG and MPI. LVEF 58%    Event Monitor 9/5/21-10/4/21 - NSR, Avg HR 70 bpm,  2 pauses, longest 4.7 sec,  No Afib    ---> Toprol XL cut to 25 mg after 4.7 sec pause found         EKG 7/16/21 - NSR, Possible Inferior infarct;  T wave abnormality, consider lateral ischemia  EKG 8/9/21 - NSR, NSST changes, prolonged QT  EKG 12/9/22 - NSR    ASSESSMENT AND PLAN:     Assessment and Plan:    1) Acute CVA 7/21 with Vertigo 8/21  - cont statin and ASA  - CRISTINA and event monitor looked OK   - He is doing much better and back to baseline 10/15/21     2) Abnormal EKG / atypical CP / Risk factors / Diaphoresis   - Exercise cardiolite 9/21/21 - walked 6:30 (7 METS), Normal stress EKG and MPI.   LVEF 58%  - he denies any cardiac complaints     3) HTN  - BP OK - continue meds   - 120s/70s at home    4) Dyslipidemia   - cont statin   - followed by PCP -- would prefer LDL < 70 at least -----> Would check labs and then adjust meds as needed (can add Zetia) - he sees PCP in May 2023 and prefers for him to adjust meds     5) NOEL - could not tolerate CPAP     6) See me in 1 year    [facility]) - plans on retiring next year. Enjoys hunting, fishing. Lives with his wife. Emma Estrada MD, [address], [address]  [place] [address]  [address]  [place] [facility]  Ph: [phone]   Ph [phone]

## 2022-12-23 RX ORDER — METOPROLOL SUCCINATE 25 MG/1
TABLET, EXTENDED RELEASE ORAL
Qty: 90 TABLET | Refills: 3 | Status: SHIPPED | OUTPATIENT
Start: 2022-12-23

## 2022-12-23 NOTE — TELEPHONE ENCOUNTER
Refill per VO of Dr. Shree Ac  Last appt: 12/9/2022  Future Appointments   Date Time Provider Noe Pascali   5/15/2023  8:00 AM Yessy Nicole MD NEUR[facility] BS AMB   12/11/2023  8:20 AM Saritha Childers MD [facility] BS AMB       Requested Prescriptions     Signed Prescriptions Disp Refills    metoprolol succinate (TOPROL-XL) 25 mg XL tablet 90 Tablet 3     Sig: TAKE ONE TABLET BY MOUTH DAILY     Authorizing Provider: Mali Ballesteros     Ordering User: Stewart Sherwood

## 2023-01-07 NOTE — DISCHARGE SUMMARY
Hospitalist Discharge Summary     Patient ID:    Itzel Nelson  [id number]  81 y.o.  1960    Admit date of service: 8/9/2021    Discharge date of service: 8/11/2021    Admission Diagnoses: Acute severe vertigo [R42]  Cerebrovascular accident, old [Z86.73]  Leukocytosis [D72.829]    Chronic Diagnoses:    Problem List as of 8/11/2021 Date Reviewed: 8/10/2021        Codes Class Noted - Resolved    Leukocytosis ICD-10-CM: D72.829  ICD-9-CM: 288.60  8/10/2021 - Present        Cerebrovascular accident, old ICD-10-CM: Z86.73  ICD-9-CM: V12.54  8/10/2021 - Present        Acute severe vertigo ICD-10-CM: R42  ICD-9-CM: 780.4  8/10/2021 - Present        Cerebellar stroke syndrome ICD-10-CM: G46.4  ICD-9-CM: 965  8/10/2021 - Present        Elevated blood pressure reading ICD-10-CM: R03.0  ICD-9-CM: 796.2  7/16/2021 - Present        Acute CVA (cerebrovascular accident) [facility]) ICD-10-CM: I63.9  ICD-9-CM: 434.91  7/15/2021 - Present              Discharge Medications:   Current Discharge Medication List      START taking these medications    Details   amLODIPine (NORVASC) 5 mg tablet Take 1 Tablet by mouth daily. Qty: 30 Tablet, Refills: 0      meclizine (ANTIVERT) 25 mg tablet Take 1 Tablet by mouth three (3) times daily as needed for Dizziness for up to 10 days. Qty: 30 Tablet, Refills: 0         CONTINUE these medications which have NOT CHANGED    Details   aspirin delayed-release 325 mg tablet Take 325 mg by mouth daily. metoprolol succinate (Toprol XL) 100 mg tablet Take 100 mg by mouth daily. atorvastatin (LIPITOR) 80 mg tablet Take 1 Tablet by mouth nightly for 30 days. Qty: 30 Tablet, Refills: 1             Follow up Care:    1. Miguel Louise MD in 1-2 weeks  2. Diet:  Cardiac Diet    Disposition:  Home. Advanced Directive:    Discharge Exam:  See today's note.     CONSULTATIONS: Neurology    Significant Diagnostic Studies:   Recent Labs     08/11/21  0305 08/10/21  0535   WBC 11.0 13.9*   HGB 13.2 13.5   HCT 41.0 40.8    355     Recent Labs     08/10/21  0535 08/09/21 2307 08/09/21 2130     --  131*   K 3.8 3.8 6.9*  6.7*     --  104   CO2 28  --  25   BUN 11  --  11   CREA 0.51*  --  0.66*   *  --  133*   CA 8.2*  --  7.9*   MG 2.2  --   --    PHOS 3.8  --   --      Recent Labs     08/09/21  2130   ALT 33   AP 69   TBILI 1.0   TP 8.6*   ALB 3.6   GLOB 5.0*     Recent Labs     08/10/21  0535 08/09/21  2306   INR  --  1.1   PTP  --  11.0   APTT 27.4  --       No results for input(s): FE, TIBC, PSAT, FERR in the last 72 hours. No results for input(s): PH, PCO2, PO2 in the last 72 hours. No results for input(s): CPK, CKMB in the last 72 hours. No lab exists for component: TROPONINI  Lab Results   Component Value Date/Time    Glucose (POC) 131 (H) 08/09/2021 11:09 PM             HOSPITAL COURSE & TREATMENT RENDERED:   1. Acute severe vertigo. Hx of CVA:  MRI of the brain is unremarkable. continue aspirin, statin, antiemetics, meclizine, and supportive care. Evaluated by neurology, PT/OT. Needs outpatient PT.      2.  HTN. BP not well controlled. Add amlodipine.      3.  Leukocytosis: resolved. No evidence of infection          Discharged in stable condition.     Spent 35 minutes    Signed:  Horace Clarke MD  8/11/2021  11:16 AM No

## 2023-02-21 RX ORDER — AMLODIPINE BESYLATE 5 MG/1
TABLET ORAL
Qty: 90 TABLET | Refills: 3 | Status: SHIPPED | OUTPATIENT
Start: 2023-02-21

## 2023-02-21 NOTE — TELEPHONE ENCOUNTER
Refill per VO of Dr. Wolf Begum  Last appt: 12/9/2022  Future Appointments   Date Time Provider Noe Pascali   5/15/2023  8:00 AM Nuha Tamayo MD NEUROW[facility] BS AMB   12/11/2023  8:20 AM Flavio Diaz MD [facility] BS AMB       Requested Prescriptions     Pending Prescriptions Disp Refills    amLODIPine (NORVASC) 5 mg tablet [Pharmacy Med Name: amLODIPine BESYLATE 5 MG TAB] 90 Tablet 1     Sig: TAKE ONE TABLET BY MOUTH DAILY

## 2023-05-15 ENCOUNTER — OFFICE VISIT (OUTPATIENT)
Age: 63
End: 2023-05-15
Payer: COMMERCIAL

## 2023-05-15 VITALS
SYSTOLIC BLOOD PRESSURE: 138 MMHG | HEIGHT: 74 IN | RESPIRATION RATE: 18 BRPM | WEIGHT: 303 LBS | BODY MASS INDEX: 38.89 KG/M2 | DIASTOLIC BLOOD PRESSURE: 80 MMHG | TEMPERATURE: 97.7 F | HEART RATE: 87 BPM | OXYGEN SATURATION: 97 %

## 2023-05-15 DIAGNOSIS — E78.2 MIXED HYPERLIPIDEMIA: ICD-10-CM

## 2023-05-15 DIAGNOSIS — I10 ESSENTIAL (PRIMARY) HYPERTENSION: ICD-10-CM

## 2023-05-15 DIAGNOSIS — G44.86 CERVICOGENIC HEADACHE: Primary | ICD-10-CM

## 2023-05-15 DIAGNOSIS — Z86.73 HISTORY OF CVA (CEREBROVASCULAR ACCIDENT): ICD-10-CM

## 2023-05-15 DIAGNOSIS — M54.81 OCCIPITAL NEURALGIA OF LEFT SIDE: ICD-10-CM

## 2023-05-15 PROCEDURE — 99214 OFFICE O/P EST MOD 30 MIN: CPT | Performed by: PSYCHIATRY & NEUROLOGY

## 2023-05-15 PROCEDURE — 3079F DIAST BP 80-89 MM HG: CPT | Performed by: PSYCHIATRY & NEUROLOGY

## 2023-05-15 PROCEDURE — 3075F SYST BP GE 130 - 139MM HG: CPT | Performed by: PSYCHIATRY & NEUROLOGY

## 2023-05-15 RX ORDER — ASPIRIN 325 MG
TABLET ORAL
COMMUNITY

## 2023-05-15 RX ORDER — HYDROCODONE BITARTRATE AND ACETAMINOPHEN 5; 325 MG/1; MG/1
TABLET ORAL
COMMUNITY
Start: 2023-04-16 | End: 2023-05-15

## 2023-05-15 RX ORDER — ROSUVASTATIN CALCIUM 10 MG/1
10 TABLET, COATED ORAL
COMMUNITY

## 2023-05-15 RX ORDER — AMLODIPINE BESYLATE 5 MG/1
1 TABLET ORAL DAILY
COMMUNITY
Start: 2023-02-21

## 2023-05-15 RX ORDER — AMITRIPTYLINE HYDROCHLORIDE 10 MG/1
TABLET, FILM COATED ORAL
COMMUNITY

## 2023-05-15 RX ORDER — METOPROLOL SUCCINATE 25 MG/1
1 TABLET, EXTENDED RELEASE ORAL DAILY
COMMUNITY
Start: 2022-12-23

## 2023-05-15 NOTE — PROGRESS NOTES
NEUROLOGY CLINIC NOTE    Patient ID:  Dulce Maria Delcid  [id number]  89 y.o.  1960    Date of Visit:  May 15, 2023    Reason for Visit:  Stroke, headache    Chief Complaint   Patient presents with    Headache     Follow up- patient reports he has had about 1-2 headaches in the last 30 days         History of Present Illness:     Patient Active Problem List    Diagnosis Date Noted    Leukocytosis 08/10/2021    Cerebrovascular accident, old 08/10/2021    Acute severe vertigo 08/10/2021    Cerebellar stroke syndrome 08/10/2021    Elevated blood pressure reading 07/16/2021    Acute CVA (cerebrovascular [address]) [address] [address] 07/15/2021     Past Medical History:   Diagnosis Date    CVA (cerebral vascular accident) ([facility] [address]     HTN (hypertension)     Hyperlipidemia     Vertigo        No past surgical history on file. Prior to Admission medications    Medication Sig Start Date End Date Taking? Authorizing Provider   amitriptyline (ELAVIL) 10 mg tablet Take 3 Tablets by mouth nightly. Patient taking differently: Take 10 mg by mouth nightly. 10/28/22  Yes Delmar Martinez MD   metoprolol succinate (TOPROL-XL) 25 mg XL tablet TAKE ONE TABLET BY MOUTH DAILY 9/28/22  Yes Angela Mahan MD   amLODIPine (NORVASC) 5 mg tablet TAKE ONE TABLET BY MOUTH DAILY 8/31/22  Yes Angela Mahan MD   TADALAFIL PO 5 mg nightly. 7/13/22  Yes Provider, Historical   aspirin (ASPIRIN) 325 mg tablet Take 325 mg by mouth daily. Yes Provider, Historical   atorvastatin (LIPITOR) 80 mg tablet Take 80 mg by mouth daily. Yes Provider, Historical     No Known Allergies   Social History     Tobacco Use    Smoking status: Never    Smokeless tobacco: Never   Substance Use Topics    Alcohol use: Yes     Comment: some      Family History   Problem Relation Age of Onset    Hypertension Father     Heart Disease Father     Ataxia Father          Subjective:      Dulce Maria Delcid is a 57 y.o.  RHWM with history of CVA, hypertension, hyperlipidemia and vertigo was here

## 2023-05-22 NOTE — TELEPHONE ENCOUNTER
Refill per VO of Dr. Reynolds Wilian  Last appt: 10/15/2021  Future Appointments   Date Time Provider Noe De La Torre   10/21/2022  8:20 AM Katie Roberts MD [facility] BS AMB   11/15/2022  8:00 AM Flor Stokes MD NEUROWTC BS AMB       Requested Prescriptions     Signed Prescriptions Disp Refills    metoprolol succinate (TOPROL-XL) 25 mg XL tablet 90 Tablet 0     Sig: TAKE ONE TABLET BY MOUTH DAILY     Authorizing Provider: Abhishek Francois     Ordering User: Miguel Smith
What Type Of Note Output Would You Prefer (Optional)?: Standard Output
Hpi Title: Evaluation of Skin Lesions
How Severe Are Your Spot(S)?: mild
Have Your Spot(S) Been Treated In The Past?: has not been treated

## 2023-10-24 RX ORDER — AMITRIPTYLINE HYDROCHLORIDE 10 MG/1
TABLET, FILM COATED ORAL
Qty: 90 TABLET | Refills: 3 | Status: SHIPPED | OUTPATIENT
Start: 2023-10-24

## 2023-12-13 ENCOUNTER — OFFICE VISIT (OUTPATIENT)
Age: 63
End: 2023-12-13
Payer: COMMERCIAL

## 2023-12-13 VITALS
DIASTOLIC BLOOD PRESSURE: 78 MMHG | WEIGHT: 300 LBS | HEIGHT: 74 IN | BODY MASS INDEX: 38.5 KG/M2 | SYSTOLIC BLOOD PRESSURE: 132 MMHG | OXYGEN SATURATION: 95 % | HEART RATE: 78 BPM

## 2023-12-13 DIAGNOSIS — I10 ESSENTIAL (PRIMARY) HYPERTENSION: Primary | ICD-10-CM

## 2023-12-13 DIAGNOSIS — E78.5 DYSLIPIDEMIA: ICD-10-CM

## 2023-12-13 DIAGNOSIS — I63.9 ACUTE CVA (CEREBROVASCULAR ACCIDENT) (HCC): ICD-10-CM

## 2023-12-13 PROCEDURE — 3075F SYST BP GE 130 - 139MM HG: CPT | Performed by: SPECIALIST

## 2023-12-13 PROCEDURE — 3078F DIAST BP <80 MM HG: CPT | Performed by: SPECIALIST

## 2023-12-13 PROCEDURE — 93000 ELECTROCARDIOGRAM COMPLETE: CPT | Performed by: SPECIALIST

## 2023-12-13 PROCEDURE — 99214 OFFICE O/P EST MOD 30 MIN: CPT | Performed by: SPECIALIST

## 2023-12-13 PROCEDURE — 1123F ACP DISCUSS/DSCN MKR DOCD: CPT | Performed by: SPECIALIST

## 2023-12-13 RX ORDER — LOSARTAN POTASSIUM 25 MG/1
25 TABLET ORAL DAILY
COMMUNITY

## 2023-12-13 NOTE — PROGRESS NOTES
Room # 4  Chief Complaint   Patient presents with    Annual Exam     Vitals:    12/13/23 1332   BP: 132/78   Site: Left Upper Arm   Position: Sitting   Cuff Size: Large Adult   Pulse: 78   SpO2: 95%   Weight: 136.1 kg (300 lb)   Height: 1.88 m (6' 2\")     Chest pain denied   SOB denied   Palpitations denied   Swelling in hands/feet denied   Dizziness denied   Recent hospital stays denied   Refills denied     Has Simvastatin on home med list.  Losartan unsure if taking

## 2023-12-13 NOTE — PROGRESS NOTES
Milton Baer MD. [facility]          Patient: Billie [place]  : 1960      Today's Date: 2023        HISTORY OF PRESENT ILLNESS:     History of Present Illness:    Has no cardiac complaints. No CP or SOB. Feels well. PAST MEDICAL HISTORY:     Past Medical History:   Diagnosis Date    CVA (cerebral vascular accident) ([address])     HTN (hypertension)     Hyperlipidemia     Vertigo            CURRENT MEDICATIONS:    .  Current Outpatient Medications   Medication Sig Dispense Refill    amitriptyline (ELAVIL) 10 MG tablet TAKE THREE TABLETS BY MOUTH ONCE NIGHTLY (Patient taking differently: Take 1 tablet by mouth nightly Up to 3 tablets PRN QHS) 90 tablet 3    amLODIPine (NORVASC) 5 MG tablet Take 1 tablet by mouth daily      aspirin 325 MG tablet Take 325 mg by mouth daily. metoprolol succinate (TOPROL XL) 25 MG extended release tablet Take 1 tablet by mouth daily      TADALAFIL PO 5 mg      losartan (COZAAR) 25 MG tablet Take 1 tablet by mouth daily      rosuvastatin (CRESTOR) 10 MG tablet Take 1 tablet by mouth       No current facility-administered medications for this visit. No Known Allergies      SOCIAL HISTORY:     Social History     Tobacco Use    Smoking status: Never    Smokeless tobacco: Never   Substance Use Topics    Alcohol use: Yes     Comment: rarely    Drug use: Never         FAMILY HISTORY:     Family History   Problem Relation Age of Onset    Heart Disease Father     Hypertension Father     Ataxia Father          REVIEW OF SYMPTOMS:     Review of Symptoms:  Constitutional: Negative for fever, chills  HEENT: Negative for nosebleeds, tinnitus, and vision changes. Respiratory: Negative for cough, wheezing  Cardiovascular: Negative for orthopnea, claudication, syncope  Gastrointestinal: Negative for abdominal pain, diarrhea, melena. Genitourinary: Negative for dysuria  Musculoskeletal: Negative for myalgias. Skin: Negative for rash  Heme: No problems bleeding.   Neurological:

## 2024-01-12 NOTE — LETTER
8/19/2021    Patient: Maxwell Wolf   YOB: 1960   Date of Visit: 8/19/2021     Marlo Ortez MD  [address] [place] [phone]  Via Fax: [phone]    Dear Marlo Ortez MD,      Thank you for referring Mr. Maxwell Wolf to [facility] for evaluation. My notes for this consultation are attached. If you have questions, please do not hesitate to call me. I look forward to following your patient along with you.       Sincerely,    Dipti Kelly MD Fax confirmation received

## 2024-01-22 RX ORDER — METOPROLOL SUCCINATE 25 MG/1
25 TABLET, EXTENDED RELEASE ORAL DAILY
Qty: 90 TABLET | Refills: 3 | Status: SHIPPED | OUTPATIENT
Start: 2024-01-22

## 2024-01-22 NOTE — TELEPHONE ENCOUNTER
Refill per VO of Dr. Fields  Last appt: 12/13/2023    Future Appointments   Date Time Provider Department Center   5/20/2024  8:00 AM Estuardo Adams Jr., MD NEUR[facility] BS AMB   12/16/2024  2:00 PM Yoanna Fields MD [facility] BS AMB       Requested Prescriptions     Signed Prescriptions Disp Refills    metoprolol succinate (TOPROL XL) 25 MG extended release tablet 90 tablet 3     Sig: TAKE ONE TABLET BY MOUTH DAILY     Authorizing Provider: YOANNA FIELDS     Ordering User: FELIX CAMPA

## 2024-02-20 RX ORDER — AMLODIPINE BESYLATE 5 MG/1
5 TABLET ORAL DAILY
Qty: 90 TABLET | Refills: 3 | Status: SHIPPED | OUTPATIENT
Start: 2024-02-20

## 2024-02-20 NOTE — TELEPHONE ENCOUNTER
Refill per VO of Dr. Fields  Last appt: 12/13/2023    Future Appointments   Date Time Provider Department Center   5/20/2024  8:00 AM Estuardo Adams Jr., MD NEUR[facility] BS AMB   12/16/2024  2:00 PM Yoanna Fields MD [facility] BS AMB       Requested Prescriptions     Signed Prescriptions Disp Refills    amLODIPine (NORVASC) 5 MG tablet 90 tablet 3     Sig: TAKE ONE TABLET BY MOUTH DAILY     Authorizing Provider: YOANNA FIELDS     Ordering User: FELIX CAMPA

## 2024-09-06 ENCOUNTER — OFFICE VISIT (OUTPATIENT)
Age: 64
End: 2024-09-06
Payer: MEDICARE

## 2024-09-06 VITALS
HEART RATE: 69 BPM | SYSTOLIC BLOOD PRESSURE: 130 MMHG | WEIGHT: 310 LBS | BODY MASS INDEX: 39.78 KG/M2 | HEIGHT: 74 IN | RESPIRATION RATE: 18 BRPM | DIASTOLIC BLOOD PRESSURE: 78 MMHG | OXYGEN SATURATION: 97 %

## 2024-09-06 DIAGNOSIS — G44.86 CERVICOGENIC HEADACHE: Primary | ICD-10-CM

## 2024-09-06 DIAGNOSIS — Z86.73 HISTORY OF CVA (CEREBROVASCULAR ACCIDENT): ICD-10-CM

## 2024-09-06 DIAGNOSIS — M54.81 OCCIPITAL NEURALGIA OF LEFT SIDE: ICD-10-CM

## 2024-09-06 PROCEDURE — 1123F ACP DISCUSS/DSCN MKR DOCD: CPT | Performed by: PSYCHIATRY & NEUROLOGY

## 2024-09-06 PROCEDURE — G8417 CALC BMI ABV UP PARAM F/U: HCPCS | Performed by: PSYCHIATRY & NEUROLOGY

## 2024-09-06 PROCEDURE — 99214 OFFICE O/P EST MOD 30 MIN: CPT | Performed by: PSYCHIATRY & NEUROLOGY

## 2024-09-06 PROCEDURE — 1036F TOBACCO NON-USER: CPT | Performed by: PSYCHIATRY & NEUROLOGY

## 2024-09-06 PROCEDURE — 3017F COLORECTAL CA SCREEN DOC REV: CPT | Performed by: PSYCHIATRY & NEUROLOGY

## 2024-09-06 PROCEDURE — G8427 DOCREV CUR MEDS BY ELIG CLIN: HCPCS | Performed by: PSYCHIATRY & NEUROLOGY

## 2024-09-06 RX ORDER — AMITRIPTYLINE HYDROCHLORIDE 10 MG/1
10 TABLET ORAL NIGHTLY
Qty: 90 TABLET | Refills: 3 | Status: SHIPPED | OUTPATIENT
Start: 2024-09-06

## 2024-09-06 NOTE — PROGRESS NOTES
NEUROLOGY CLINIC NOTE    Patient ID:  Merrill Figueroa  [id number]  64 y.o.  1960    Date of Visit:  September 6, 2024    Reason for Visit:  Stroke, headache    Chief Complaint   Patient presents with    Headache     Follow up- patient reports he has not had any headaches in the last 30 days.         History of Present Illness:     Patient Active Problem List   Diagnosis    Acute CVA (cerebrovascular accident) (HCC)    Cerebrovascular accident, old    Elevated blood pressure reading    Cerebellar stroke syndrome    Leukocytosis    Acute severe vertigo     Past Medical History:   Diagnosis Date    CVA (cerebral vascular accident) (HCC)     HTN (hypertension)     Hyperlipidemia     Sleep apnea     Vertigo      No past surgical history on file.  Current Outpatient Medications on File Prior to Visit   Medication Sig Dispense Refill    amLODIPine (NORVASC) 5 MG tablet TAKE ONE TABLET BY MOUTH DAILY 90 tablet 3    metoprolol succinate (TOPROL XL) 25 MG extended release tablet TAKE ONE TABLET BY MOUTH DAILY 90 tablet 3    losartan (COZAAR) 25 MG tablet Take 1 tablet by mouth daily      aspirin 325 MG tablet Take 325 mg by mouth daily.      rosuvastatin (CRESTOR) 10 MG tablet Take 1 tablet by mouth      TADALAFIL PO 5 mg       No current facility-administered medications on file prior to visit.     No Known Allergies  Social History     Tobacco Use    Smoking status: Never    Smokeless tobacco: Never   Substance Use Topics    Alcohol use: Yes     Alcohol/week: 4.0 standard drinks of alcohol     Types: 2 Cans of beer, 2 Shots of liquor per week     Comment: rarely    Drug use: Never     Family History   Problem Relation Age of Onset    Heart Disease Father     Hypertension Father     Ataxia Father          Subjective:      Merrill Figueroa is a 64 y.o. RHWM with history of cervicogenic headache, occipital neuralgia, CVA, hypertension, hyperlipidemia and vertigo was here for further evaluation after a recent stroke. Patient is here

## 2024-12-27 ENCOUNTER — OFFICE VISIT (OUTPATIENT)
Age: 64
End: 2024-12-27
Payer: MEDICARE

## 2024-12-27 VITALS
WEIGHT: 315 LBS | OXYGEN SATURATION: 94 % | HEIGHT: 74 IN | HEART RATE: 75 BPM | BODY MASS INDEX: 40.43 KG/M2 | SYSTOLIC BLOOD PRESSURE: 130 MMHG | DIASTOLIC BLOOD PRESSURE: 70 MMHG

## 2024-12-27 DIAGNOSIS — I10 ESSENTIAL (PRIMARY) HYPERTENSION: Primary | ICD-10-CM

## 2024-12-27 DIAGNOSIS — E78.5 DYSLIPIDEMIA: ICD-10-CM

## 2024-12-27 DIAGNOSIS — Z86.73 CEREBROVASCULAR ACCIDENT, OLD: ICD-10-CM

## 2024-12-27 PROCEDURE — 3017F COLORECTAL CA SCREEN DOC REV: CPT | Performed by: SPECIALIST

## 2024-12-27 PROCEDURE — G8484 FLU IMMUNIZE NO ADMIN: HCPCS | Performed by: SPECIALIST

## 2024-12-27 PROCEDURE — 1160F RVW MEDS BY RX/DR IN RCRD: CPT | Performed by: SPECIALIST

## 2024-12-27 PROCEDURE — 3078F DIAST BP <80 MM HG: CPT | Performed by: SPECIALIST

## 2024-12-27 PROCEDURE — 1036F TOBACCO NON-USER: CPT | Performed by: SPECIALIST

## 2024-12-27 PROCEDURE — 1123F ACP DISCUSS/DSCN MKR DOCD: CPT | Performed by: SPECIALIST

## 2024-12-27 PROCEDURE — 93005 ELECTROCARDIOGRAM TRACING: CPT | Performed by: SPECIALIST

## 2024-12-27 PROCEDURE — 3075F SYST BP GE 130 - 139MM HG: CPT | Performed by: SPECIALIST

## 2024-12-27 PROCEDURE — 99214 OFFICE O/P EST MOD 30 MIN: CPT | Performed by: SPECIALIST

## 2024-12-27 PROCEDURE — G8427 DOCREV CUR MEDS BY ELIG CLIN: HCPCS | Performed by: SPECIALIST

## 2024-12-27 PROCEDURE — 1159F MED LIST DOCD IN RCRD: CPT | Performed by: SPECIALIST

## 2024-12-27 PROCEDURE — 93010 ELECTROCARDIOGRAM REPORT: CPT | Performed by: SPECIALIST

## 2024-12-27 PROCEDURE — G8417 CALC BMI ABV UP PARAM F/U: HCPCS | Performed by: SPECIALIST

## 2024-12-27 NOTE — PATIENT INSTRUCTIONS
Patient Education        Learning About the Mediterranean Diet  What is the Mediterranean diet?     The Mediterranean diet is a style of eating rather than a diet plan. It features foods eaten in [place], [place], southern [place] and [place], and other countries along the [place] Sea. It emphasizes eating foods like fish, fruits, vegetables, beans, high-fiber breads and whole grains, nuts, and olive oil. This style of eating includes limited red meat, cheese, and sweets.  Why choose the Mediterranean diet?  A Mediterranean-style diet may improve heart health. It contains more fat than other heart-healthy diets. But the fats are mainly from nuts, unsaturated oils (such as fish oils and olive oil), and certain nut or seed oils (such as canola, soybean, or flaxseed oil). These fats may help protect the heart and blood vessels.  How can you get started on the Mediterranean diet?  Here are some things you can do to switch to a more Mediterranean way of eating.  What to eat  Eat a variety of fruits and vegetables each day, such as grapes, blueberries, tomatoes, broccoli, peppers, figs, olives, spinach, eggplant, beans, lentils, and chickpeas.  Eat a variety of whole-grain foods each day, such as oats, brown rice, and whole wheat bread, pasta, and couscous.  Eat fish at least 2 times a week. Try tuna, salmon, mackerel, lake trout, herring, or sardines.  Eat moderate amounts of low-fat dairy products, such as milk, cheese, or yogurt.  Eat moderate amounts of poultry and eggs.  Choose healthy (unsaturated) fats, such as nuts, olive oil, and certain nut or seed oils like canola, soybean, and flaxseed.  Limit unhealthy (saturated) fats, such as butter, palm oil, and coconut oil. And limit fats found in animal products, such as meat and dairy products made with whole milk. Try to eat red meat only a few times a month in very small amounts.  Limit sweets and desserts to only a few times a week. This includes sugar-sweetened

## 2024-12-27 NOTE — PROGRESS NOTES
Chief Complaint   Patient presents with    Hypertension    Cholesterol Problem    Other     CVA     Vitals:    12/27/24 1004   BP: 130/70   Site: Left Upper Arm   Position: Sitting   Cuff Size: Medium Adult   Pulse: 75   SpO2: 94%   Weight: (!) 143.3 kg (316 lb)   Height: 1.88 m (6' 2\")      /70 (Site: Left Upper Arm, Position: Sitting, Cuff Size: Medium Adult)   Pulse 75   Ht 1.88 m (6' 2\")   Wt (!) 143.3 kg (316 lb)   SpO2 94%   BMI 40.57 kg/m²        
myself       PLACIDO 9/3/21 - normal study       Exercise cardiolite 9/21/21 - walked 6:30 (7 METS), Normal stress EKG and MPI.  LVEF 58%      Event Monitor 9/5/21-10/4/21 - NSR, Avg HR 70 bpm,  2 pauses, longest 4.7 sec,  No Afib     ---> Toprol XL cut to 25 mg after 4.7 sec pause found            EKG 7/16/21 - NSR, Possible Inferior infarct;  T wave abnormality, consider lateral ischemia  EKG 8/9/21 - NSR, NSST changes, prolonged QT  EKG 12/9/22 - NSR  EKG 12/13/23 - NSR, possible old inferior infarct, NSST changes   EKG 12/27/24 - sinus, non-specific T wave abn         ASSESSMENT AND PLAN:     Assessment and Plan:    1) Acute CVA 7/21 with Vertigo 8/21  - PLACIDO and event monitor looked OK   - He is doing much better and back to baseline 10/15/21   - cont statin and ASA     2) Abnormal EKG / atypical CP / Risk factors / Diaphoresis   - Exercise cardiolite 9/21/21 - walked 6:30 (7 METS), Normal stress EKG and MPI.  LVEF 58%  - he denies any cardiac complaints      3) HTN  - BP OK - continue meds      4) Dyslipidemia   - cont statin   - followed by PCP -- would prefer LDL < 70     5) SHILPA - could not tolerate CPAP     6) ED  - He can continue tadalafil from a cardiac standpoint      6) See me in 1 year   Retired in 2024 - .  Enjoys hunting, fishing. Lives with his wife. 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        Jeferson Fields MD, [facility] [place]  [facility]  [address], [address]  [address]  [place], [place]  [address]  [place] [address]  Ph: [phone]   Ph [phone]